# Patient Record
Sex: MALE | Race: WHITE | NOT HISPANIC OR LATINO | URBAN - NONMETROPOLITAN AREA
[De-identification: names, ages, dates, MRNs, and addresses within clinical notes are randomized per-mention and may not be internally consistent; named-entity substitution may affect disease eponyms.]

---

## 2021-01-15 ENCOUNTER — OFFICE VISIT (OUTPATIENT)
Dept: FAMILY MEDICINE CLINIC | Facility: CLINIC | Age: 51
End: 2021-01-15
Payer: COMMERCIAL

## 2021-01-15 VITALS
DIASTOLIC BLOOD PRESSURE: 78 MMHG | HEIGHT: 72 IN | RESPIRATION RATE: 18 BRPM | WEIGHT: 200 LBS | TEMPERATURE: 97.7 F | OXYGEN SATURATION: 98 % | SYSTOLIC BLOOD PRESSURE: 138 MMHG | HEART RATE: 84 BPM | BODY MASS INDEX: 27.09 KG/M2

## 2021-01-15 DIAGNOSIS — E11.9 TYPE 2 DIABETES MELLITUS WITHOUT COMPLICATION, WITH LONG-TERM CURRENT USE OF INSULIN (HCC): ICD-10-CM

## 2021-01-15 DIAGNOSIS — I10 HTN, GOAL BELOW 140/80: ICD-10-CM

## 2021-01-15 DIAGNOSIS — E11.9 TYPE 2 DIABETES MELLITUS WITH HEMOGLOBIN A1C GOAL OF LESS THAN 7.0% (HCC): ICD-10-CM

## 2021-01-15 DIAGNOSIS — Z79.4 TYPE 2 DIABETES MELLITUS WITHOUT COMPLICATION, WITH LONG-TERM CURRENT USE OF INSULIN (HCC): ICD-10-CM

## 2021-01-15 DIAGNOSIS — R31.9 URINARY TRACT INFECTION WITH HEMATURIA, SITE UNSPECIFIED: ICD-10-CM

## 2021-01-15 DIAGNOSIS — Z00.00 ENCOUNTER FOR MEDICAL EXAMINATION TO ESTABLISH CARE: Primary | ICD-10-CM

## 2021-01-15 DIAGNOSIS — Z12.11 SCREEN FOR COLON CANCER: ICD-10-CM

## 2021-01-15 DIAGNOSIS — N39.0 URINARY TRACT INFECTION WITH HEMATURIA, SITE UNSPECIFIED: ICD-10-CM

## 2021-01-15 LAB
SL AMB  POCT GLUCOSE, UA: ABNORMAL
SL AMB LEUKOCYTE ESTERASE,UA: 3
SL AMB POCT BILIRUBIN,UA: ABNORMAL
SL AMB POCT BLOOD,UA: ABNORMAL
SL AMB POCT CLARITY,UA: ABNORMAL
SL AMB POCT COLOR,UA: YELLOW
SL AMB POCT HEMOGLOBIN AIC: 7.9 (ref ?–6.5)
SL AMB POCT KETONES,UA: ABNORMAL
SL AMB POCT NITRITE,UA: ABNORMAL
SL AMB POCT PH,UA: 5
SL AMB POCT SPECIFIC GRAVITY,UA: 1.01
SL AMB POCT URINE PROTEIN: ABNORMAL
SL AMB POCT UROBILINOGEN: 0.2

## 2021-01-15 PROCEDURE — 81025 URINE PREGNANCY TEST: CPT | Performed by: FAMILY MEDICINE

## 2021-01-15 PROCEDURE — 83036 HEMOGLOBIN GLYCOSYLATED A1C: CPT | Performed by: FAMILY MEDICINE

## 2021-01-15 PROCEDURE — 99204 OFFICE O/P NEW MOD 45 MIN: CPT | Performed by: FAMILY MEDICINE

## 2021-01-15 RX ORDER — LISINOPRIL 10 MG/1
10 TABLET ORAL DAILY
Qty: 30 TABLET | Refills: 2 | Status: SHIPPED | OUTPATIENT
Start: 2021-01-15 | End: 2021-11-03 | Stop reason: SDUPTHER

## 2021-01-15 RX ORDER — LEVOFLOXACIN 500 MG/1
500 TABLET, FILM COATED ORAL EVERY 24 HOURS
Qty: 10 TABLET | Refills: 0 | Status: SHIPPED | OUTPATIENT
Start: 2021-01-15 | End: 2021-01-25

## 2021-01-15 RX ORDER — GABAPENTIN 100 MG/1
100 CAPSULE ORAL AS NEEDED
COMMUNITY

## 2021-01-15 RX ORDER — INSULIN GLARGINE 100 [IU]/ML
30 INJECTION, SOLUTION SUBCUTANEOUS DAILY
Qty: 900 UNITS | Refills: 2 | Status: SHIPPED | OUTPATIENT
Start: 2021-01-15 | End: 2021-03-05

## 2021-01-15 RX ORDER — INSULIN GLARGINE 100 [IU]/ML
INJECTION, SOLUTION SUBCUTANEOUS
COMMUNITY
End: 2021-01-15 | Stop reason: SDUPTHER

## 2021-01-15 NOTE — PATIENT INSTRUCTIONS
Take 3 units of novolog for blood sugar between 150-200  Take 5 units of novolog for blood sugar between 200-250  Take 7 units of novolog for blood sugar between 250-300  Take 9 units of novolog for blood sugar between 300-350

## 2021-01-15 NOTE — PROGRESS NOTES
Assessment/Plan:    No problem-specific Assessment & Plan notes found for this encounter  Diagnoses and all orders for this visit:    Encounter for medical examination to establish care    Type 2 diabetes mellitus without complication, with long-term current use of insulin ([facility])  -     POCT hemoglobin A1c  -     insulin aspart (NovoLOG) 100 units/mL injection; Take 3 units of novolog for blood sugar between 150-200  Take 5 units of novolog for blood sugar between 200-250  Take 7 units of novolog for blood sugar between 250-300  Take 9 units of novolog for blood sugar between 300-350  -     insulin glargine (LANTUS) 100 units/mL subcutaneous injection; Inject 30 Units under the skin daily 30-35 in AM  -     lisinopril (ZESTRIL) 10 mg tablet; Take 1 tablet (10 mg total) by mouth daily    Screen for colon cancer  -     Cologuard; Future    Urinary tract infection with hematuria, site unspecified  -     POCT urine dip  -     levofloxacin (LEVAQUIN) 500 mg tablet; Take 1 tablet (500 mg total) by mouth every 24 hours for 10 days    HTN, goal below 140/80    Type 2 diabetes mellitus with hemoglobin A1c goal of less than 7 0% ([facility])    Other orders  -     Discontinue: insulin glargine (LANTUS) 100 units/mL subcutaneous injection; Inject under the skin daily at bedtime 30-35 in AM  -     Discontinue: insulin aspart (NovoLOG) 100 units/mL injection; Inject under the skin as needed for high blood sugar  -     gabapentin (NEURONTIN) 100 mg capsule; Take 100 mg by mouth as needed          -records release for [facility] and Dr Monique signs  We will hold off on blood work until ER records are available   -reviewed new sliding scale with patient and provided handout  Discussed continue carb counting   -advised patient to complete abx for entire course  -RTC 6 weeks or sooner if needed    Subjective:      Patient ID: Truong Aguilar is a 45 y o  male who presents to establish care   He states he was evaluated and treated for DKA in [facility] last month  He has a history of type 2 DM for 15+ years  He notes due to insurance changes frequently goes without his insulin  He notes he is currently taking lantus 30 units every morning and sliding scale novolog, averaging between 5-8 units  He checks his blood sugar 2-3x daily and brought his glucometer today  Unfortunately his sugars are averaging ~230-250 before meals  He is actively carb counting and adjusting accordingly  His in office A1C today is 7 9%  he denies episodes of hypoglycemia  He follows with Dr Gato [place] podiatry  He is intolerant to [place] (URI like sypmtoms) and metformin (abdominal crampings)    He complains of burning with urination and urgency ongoing since Saturday but states it is getting worse  He has suprapubic pain but no flank pain  His urine is hazy but no visible blood  HPI    The following portions of the patient's history were reviewed and updated as appropriate: allergies, current medications, past family history, past medical history, past social history, past surgical history and problem list     Review of Systems   Constitutional: Negative  HENT: Negative  Eyes: Negative  Respiratory: Negative  Cardiovascular: Negative  Gastrointestinal: Negative  Endocrine: Negative  Genitourinary: Positive for dysuria, frequency and urgency  Negative for decreased urine volume, flank pain and hematuria  Suprapubic pain  cloudy   Musculoskeletal: Negative  Skin: Negative  Allergic/Immunologic: Negative  Neurological: Negative  Hematological: Negative  Psychiatric/Behavioral: Negative  Objective:      /78   Pulse 84   Temp 97 7 °F (36 5 °C)   Resp 18   Ht 6' (1 829 m)   Wt 90 7 kg (200 lb)   SpO2 98%   BMI 27 12 kg/m²     Urine dipstick shows positive for leukocytes, red blood cells  Physical Exam  Vitals signs reviewed  Constitutional:       General: He is not in acute distress  Appearance: Normal appearance  He is well-developed and normal weight  HENT:      Head: Normocephalic and atraumatic  Eyes:      Conjunctiva/sclera: Conjunctivae normal       Pupils: Pupils are equal, round, and reactive to light  Neck:      Musculoskeletal: Normal range of motion and neck supple  Thyroid: No thyromegaly  Cardiovascular:      Rate and Rhythm: Normal rate and regular rhythm  Heart sounds: Normal heart sounds  No murmur  Pulmonary:      Effort: Pulmonary effort is normal  No respiratory distress  Breath sounds: Normal breath sounds  No wheezing  Abdominal:      General: Bowel sounds are normal  There is no distension  Palpations: Abdomen is soft  Tenderness: There is no abdominal tenderness  Musculoskeletal: Normal range of motion  Skin:     General: Skin is warm and dry  Capillary Refill: Capillary refill takes less than 2 seconds  Neurological:      Mental Status: He is alert and oriented to person, place, and time  Psychiatric:         Mood and Affect: Mood normal          Behavior: Behavior normal          Thought Content:  Thought content normal          Judgment: Judgment normal

## 2021-01-28 ENCOUNTER — TELEPHONE (OUTPATIENT)
Dept: FAMILY MEDICINE CLINIC | Facility: HOME HEALTHCARE | Age: 51
End: 2021-01-28

## 2021-03-05 ENCOUNTER — OFFICE VISIT (OUTPATIENT)
Dept: FAMILY MEDICINE CLINIC | Facility: CLINIC | Age: 51
End: 2021-03-05
Payer: COMMERCIAL

## 2021-03-05 VITALS
BODY MASS INDEX: 26.68 KG/M2 | DIASTOLIC BLOOD PRESSURE: 70 MMHG | TEMPERATURE: 97.5 F | WEIGHT: 197 LBS | SYSTOLIC BLOOD PRESSURE: 124 MMHG | OXYGEN SATURATION: 98 % | HEIGHT: 72 IN | HEART RATE: 100 BPM | RESPIRATION RATE: 18 BRPM

## 2021-03-05 DIAGNOSIS — Z23 NEED FOR TDAP VACCINATION: ICD-10-CM

## 2021-03-05 DIAGNOSIS — Z79.4 TYPE 2 DIABETES MELLITUS WITHOUT COMPLICATION, WITH LONG-TERM CURRENT USE OF INSULIN (HCC): Primary | ICD-10-CM

## 2021-03-05 DIAGNOSIS — E11.9 TYPE 2 DIABETES MELLITUS WITHOUT COMPLICATION, WITH LONG-TERM CURRENT USE OF INSULIN (HCC): Primary | ICD-10-CM

## 2021-03-05 DIAGNOSIS — Z13.29 SCREENING FOR THYROID DISORDER: ICD-10-CM

## 2021-03-05 DIAGNOSIS — Z13.5 SCREENING FOR DIABETIC RETINOPATHY: ICD-10-CM

## 2021-03-05 DIAGNOSIS — Z11.4 SCREENING FOR HIV (HUMAN IMMUNODEFICIENCY VIRUS): ICD-10-CM

## 2021-03-05 PROCEDURE — 99213 OFFICE O/P EST LOW 20 MIN: CPT | Performed by: FAMILY MEDICINE

## 2021-03-05 RX ORDER — INSULIN GLARGINE 100 [IU]/ML
35 INJECTION, SOLUTION SUBCUTANEOUS DAILY
Qty: 1000 UNITS | Refills: 2
Start: 2021-03-05 | End: 2021-06-30

## 2021-03-05 RX ORDER — INSULIN LISPRO 100 [IU]/ML
INJECTION, SOLUTION SUBCUTANEOUS
Qty: 5 PEN | Refills: 1
Start: 2021-03-05 | End: 2021-11-03

## 2021-03-05 NOTE — PROGRESS NOTES
Assessment/Plan:    No problem-specific Assessment & Plan notes found for this encounter  Diagnoses and all orders for this visit:    Type 2 diabetes mellitus without complication, with long-term current use of insulin (HCC)  -     Microalbumin / creatinine urine ratio  -     CBC and differential; Future  -     Comprehensive metabolic panel; Future  -     HEMOGLOBIN A1C W/ EAG ESTIMATION; Future  -     Lipid Panel with Direct LDL reflex; Future  -     Ambulatory referral to Diabetic Education; Future  -     insulin glargine (LANTUS) 100 units/mL subcutaneous injection; Inject 35 Units under the skin daily 30-35 in AM  -     insulin lispro (HumaLOG Mauricio KwikPen) 100 units/mL injection pen; Take 3 units for blood sugar between 150-200  Take 5 units  for blood sugar between 200-250  Take 7 units for blood sugar between 250-300  Take 9 units for blood sugar between 300-350    Screening for diabetic retinopathy  -     Ambulatory referral to Ophthalmology; Future    Need for Tdap vaccination    Screening for HIV (human immunodeficiency virus)  -     HIV 1/2 Antigen/Antibody (4th Generation) w Reflex [facility]; Future    Screening for thyroid disorder  -     TSH, 3rd generation with Free T4 reflex; Future    Other orders  -     Cancel: Diabetic foot exam; Future  -     Cancel: Tdap vaccine greater than or equal to 6yo IM        -will increase lantus from 30 to 35 units  Discussed importance of frequent meals and monitoring BS  Will refer to DM educator for meal planning  Discussed risks/benefits of statin and patient declines  Advised him to let us know if his insulin in unaffordable    -labs as ordered to be completed prior to next visit  -RTC 6 weeks or sooner if concerns arise    Subjective:      Patient ID: Pepper Rahman is a 45 y o  male with a PMH Of type 2 DM, HTN presents for follow up  Unfortunately he has been having financial concerns obtaining his insulin which caused him to be out for several weeks  Additionally his formulary changed from novolog to humalog  Patient reports he now has his supplies and has been using them as directed for ~3 weeks  His fasting sugars remain elevated at 150-180  He is using his sliding scale as directed  He reports difficulty meal planning as he works second shift  He is interested in establishing with a dietician but reports financial concerns  He follows with podiatry Dr Gato [place]  His BP is at goal on his current regimen  No headaches, dizziness, vision changes  He is not on a statin and declines starting one today  Kamila Rivera reports his UTI symptoms from last visit have completely resolved  No acute complaints today  HPI    The following portions of the patient's history were reviewed and updated as appropriate: allergies, current medications, past family history, past medical history, past social history, past surgical history and problem list     Review of Systems   Constitutional: Negative  HENT: Negative  Eyes: Negative  Respiratory: Negative  Cardiovascular: Negative  Gastrointestinal: Negative  Endocrine: Negative  Genitourinary: Negative  Musculoskeletal: Negative  Skin: Negative  Allergic/Immunologic: Negative  Neurological: Negative  Hematological: Negative  Psychiatric/Behavioral: Negative  Objective:      /70   Pulse 100   Temp 97 5 °F (36 4 °C)   Resp 18   Ht 6' (1 829 m)   Wt 89 4 kg (197 lb)   SpO2 98%   BMI 26 72 kg/m²          Physical Exam  Vitals signs reviewed  Constitutional:       General: He is not in acute distress  Appearance: Normal appearance  He is well-developed and normal weight  HENT:      Head: Normocephalic and atraumatic  Eyes:      Conjunctiva/sclera: Conjunctivae normal       Pupils: Pupils are equal, round, and reactive to light  Neck:      Musculoskeletal: Normal range of motion and neck supple  Thyroid: No thyromegaly     Cardiovascular:      Rate and Rhythm: Normal rate and regular rhythm  Heart sounds: Normal heart sounds  No murmur  Pulmonary:      Effort: Pulmonary effort is normal  No respiratory distress  Breath sounds: Normal breath sounds  No wheezing  Abdominal:      General: Abdomen is flat  Bowel sounds are normal  There is no distension  Palpations: Abdomen is soft  Tenderness: There is no abdominal tenderness  Musculoskeletal: Normal range of motion  Right lower leg: No edema  Left lower leg: No edema  Skin:     General: Skin is warm and dry  Neurological:      Mental Status: He is alert and oriented to person, place, and time  Mental status is at baseline  Psychiatric:         Mood and Affect: Mood normal          Behavior: Behavior normal          Thought Content: Thought content normal          Judgment: Judgment normal          BMI Counseling: Body mass index is 26 72 kg/m²  The BMI is above normal  Nutrition recommendations include reducing portion sizes, decreasing overall calorie intake, 3-5 servings of fruits/vegetables daily, reducing fast food intake, consuming healthier snacks, decreasing soda and/or juice intake, moderation in carbohydrate intake, increasing intake of lean protein, reducing intake of saturated fat and trans fat and reducing intake of cholesterol  Exercise recommendations include moderate aerobic physical activity for 150 minutes/week, vigorous aerobic physical activity for 75 minutes/week, exercising 3-5 times per week, joining a gym and strength training exercises

## 2021-03-05 NOTE — PROGRESS NOTES
Patient's shoes and socks removed  Right Foot/Ankle   Right Foot Inspection  Skin Exam: dry skin                            Sensory       Monofilament testing: diminished  Vascular  Capillary refills: elevated      Left Foot/Ankle  Left Foot Inspection  Skin Exam: dry skin                                         Sensory       Monofilament: diminished  Vascular  Capillary refills: elevated    Assign Risk Category:  ; ;        Risk: 1

## 2021-04-08 ENCOUNTER — TELEPHONE (OUTPATIENT)
Dept: FAMILY MEDICINE CLINIC | Facility: CLINIC | Age: 51
End: 2021-04-08

## 2021-04-08 NOTE — TELEPHONE ENCOUNTER
johnny for pt to call office back to get him scheduled for his April appointment in [address] [place] [address]

## 2021-06-30 ENCOUNTER — OFFICE VISIT (OUTPATIENT)
Dept: FAMILY MEDICINE CLINIC | Facility: CLINIC | Age: 51
End: 2021-06-30
Payer: COMMERCIAL

## 2021-06-30 VITALS
HEART RATE: 118 BPM | OXYGEN SATURATION: 97 % | BODY MASS INDEX: 26.14 KG/M2 | TEMPERATURE: 97 F | RESPIRATION RATE: 18 BRPM | SYSTOLIC BLOOD PRESSURE: 120 MMHG | WEIGHT: 193 LBS | DIASTOLIC BLOOD PRESSURE: 78 MMHG | HEIGHT: 72 IN

## 2021-06-30 DIAGNOSIS — Z11.59 ENCOUNTER FOR HEPATITIS C SCREENING TEST FOR LOW RISK PATIENT: ICD-10-CM

## 2021-06-30 DIAGNOSIS — Z00.00 ANNUAL PHYSICAL EXAM: Primary | ICD-10-CM

## 2021-06-30 DIAGNOSIS — Z79.4 TYPE 2 DIABETES MELLITUS WITHOUT COMPLICATION, WITH LONG-TERM CURRENT USE OF INSULIN (HCC): ICD-10-CM

## 2021-06-30 DIAGNOSIS — N52.1 ERECTILE DYSFUNCTION DUE TO DISEASES CLASSIFIED ELSEWHERE: ICD-10-CM

## 2021-06-30 DIAGNOSIS — E11.9 TYPE 2 DIABETES MELLITUS WITHOUT COMPLICATION, WITH LONG-TERM CURRENT USE OF INSULIN (HCC): ICD-10-CM

## 2021-06-30 DIAGNOSIS — G47.9 RESTLESS SLEEPER: ICD-10-CM

## 2021-06-30 DIAGNOSIS — G25.81 RESTLESS LEGS: ICD-10-CM

## 2021-06-30 PROBLEM — I87.2 STASIS DERMATITIS OF BOTH LEGS: Status: ACTIVE | Noted: 2021-06-30

## 2021-06-30 LAB — SL AMB POCT HEMOGLOBIN AIC: 10.8 (ref ?–6.5)

## 2021-06-30 PROCEDURE — 99396 PREV VISIT EST AGE 40-64: CPT | Performed by: FAMILY MEDICINE

## 2021-06-30 PROCEDURE — 83036 HEMOGLOBIN GLYCOSYLATED A1C: CPT | Performed by: FAMILY MEDICINE

## 2021-06-30 RX ORDER — INSULIN GLARGINE 100 [IU]/ML
40 INJECTION, SOLUTION SUBCUTANEOUS DAILY
Qty: 10 ML | Refills: 2
Start: 2021-06-30 | End: 2021-09-28

## 2021-06-30 RX ORDER — ROPINIROLE 0.25 MG/1
TABLET, FILM COATED ORAL
Qty: 30 TABLET | Refills: 0 | Status: SHIPPED | OUTPATIENT
Start: 2021-06-30 | End: 2021-11-03

## 2021-06-30 NOTE — PROGRESS NOTES
[address]    NAME: Glenn Barriga  AGE: 45 y o  SEX: male  : 1970     DATE: 2021     Assessment and Plan:     Problem List Items Addressed This Visit     None      Visit Diagnoses     Annual physical exam    -  Primary    Encounter for hepatitis C screening test for low risk patient        Relevant Orders    Hepatitis C antibody    Type 2 diabetes mellitus without complication, with long-term current use of insulin (HCC)        Relevant Medications    insulin glargine (LANTUS) 100 units/mL subcutaneous injection    Other Relevant Orders    POCT hemoglobin A1c (Completed)    Restless sleeper        Relevant Orders    Home Study    Restless legs        Relevant Medications    rOPINIRole (REQUIP) 0 25 mg tablet    Other Relevant Orders    Home Study    Erectile dysfunction due to diseases classified elsewhere            -Increase lantus to 40 units daily  Discussed importance of frequent, consistent meals and adequate short term insulin dosing  He verbalized understanding  He is motivated to make the necessary dietary changes  -home sleep study as ordered  Will start requip 0 25 mg 2 hours prior to bedtime to control leg movements  He will increase to 0 5mg if needed  -RTC 3 months or sooner if concerns arise     Immunizations and preventive care screenings were discussed with patient today  Appropriate education was printed on patient's after visit summary  Counseling:  Alcohol/drug use: discussed moderation in alcohol intake, the recommendations for healthy alcohol use, and avoidance of illicit drug use  Dental Health: discussed importance of regular tooth brushing, flossing, and dental visits  Injury prevention: discussed safety/seat belts, safety helmets, smoke detectors, carbon dioxide detectors, and smoking near bedding or upholstery    Sexual health: discussed sexually transmitted diseases, partner selection, use of condoms, avoidance of unintended pregnancy, and contraceptive alternatives  · Exercise: the importance of regular exercise/physical activity was discussed  Recommend exercise 3-5 times per week for at least 30 minutes  Return in 3 months (on 9/30/2021)  Chief Complaint:     Chief Complaint   Patient presents with    Physical Exam     patient states he is scratching his legs open when he is sleeping    Diabetes     F/U- Patient states his sugars are running around the 200's      History of Present Illness:     Adult Annual Physical   Patient here for a comprehensive physical exam  The patient reports problems - his blood surgars have been elevated  He notes he is only checking his BS 2x daily, once fasting when he wakes and around 7PM (which is pt lunch 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 ) he notes he has not been compliant with his diet and insulin dosing  He takes his lantus as prescribed but only carb corrects when dosing humalog (1 unit per 10 carb)  His A1C is up to 10 8% today  He declines statin  His BP is well controlled on lisinopril  He has no headaches, changes in vision, CP  He states during sleep he has been scratching his legs with his toenails to the point where he bleeds  Initially he thought it was his dog, however, he then found a toenail at the site  He follows with Dr Tyrel Goddard podiatry for routine foot care  He does not experience RLS during the day  He does not inconsistent sleep due to his 2nd shift work schedule  He is unsure if he snores or not as he sleeps alone  No episodes or nocturnal choking  Maria Luisa Escalera is concerned with erectile dysfunction  This has been ongoing for years  He states he bought a medical grade pump that he is going to try before he considers medical therapy  He would be interested in having his testosterone level drawn  Diet and Physical Activity  · Diet/Nutrition: diabetic diet, limited junk food and low carb diet     · Exercise: no formal exercise  Depression Screening  PHQ-9 Depression Screening    PHQ-9:   Frequency of the following problems over the past two weeks:           General Health  · Sleep: sleeps poorly, gets 4-6 hours of sleep on average and unrefreshing sleep  · Hearing: normal - bilateral and 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  · Vision: goes for regular eye exams, most recent eye exam <1 year ago and wears glasses  · Dental: no dental visits for >1 year, brushes teeth twice daily and does not floss  Has dentist in [address]  · Symptoms include: erectile dysfunction     Review of Systems:     Review of Systems   Constitutional: Negative  HENT: Negative  Respiratory: Negative for cough, shortness of breath and wheezing  Cardiovascular: Negative for chest pain, palpitations and leg swelling  Gastrointestinal: Negative  Endocrine: Negative  Genitourinary: Negative  Musculoskeletal: Negative  Skin: Positive for color change and wound  Neurological: Negative for dizziness, weakness and light-headedness  Psychiatric/Behavioral: Positive for sleep disturbance  Past Medical History:     Past Medical History:   Diagnosis Date    Depression     [facility]       Past Surgical History:     History reviewed  No pertinent surgical history     Family History:     Family History   Problem Relation Age of Onset   Holli Weaver Colon cancer Mother     Heart disease Father       Social History:     Social History     Socioeconomic History    Marital status: Single     Spouse name: None    Number of children: None    Years of education: None    Highest education level: None   Occupational History    None   Tobacco Use    Smoking status: Former Smoker    Smokeless tobacco: Never Used    Tobacco comment: quite 8 years ago   Vaping Use    Vaping Use: Every day    Substances: Flavoring   Substance and Sexual Activity    Alcohol use: Yes     Comment: occasionally    Drug use: Never    Sexual activity: None   Other Topics Concern    None   Social History Narrative    None     Social Determinants of Health     Financial Resource Strain:     Difficulty of Paying Living Expenses:    Food Insecurity:     Worried About Running Out of Food in the Last Year:     [address] in the Last Year:    Transportation Needs:     Lack of Transportation (Medical):  Lack of Transportation (Non-Medical):    Physical Activity:     Days of Exercise per Week:     Minutes of Exercise per Session:    Stress:     Feeling of Stress :    Social Connections:     Frequency of Communication with Friends and Family:     Frequency of Social Gatherings with Friends and Family:     Attends Rastafarian Services:     Active Member of Clubs or Organizations:     Attends Club or Organization Meetings:     Marital Status:    Intimate Partner Violence:     Fear of Current or Ex-Partner:     Emotionally Abused:     Physically Abused:     Sexually Abused:       Current Medications:     Current Outpatient Medications   Medication Sig Dispense Refill    gabapentin (NEURONTIN) 100 mg capsule Take 100 mg by mouth as needed      insulin glargine (LANTUS) 100 units/mL subcutaneous injection Inject 40 Units under the skin daily 10 mL 2    insulin lispro (HumaLOG Mauricio KwikPen) 100 units/mL injection pen Take 3 units for blood sugar between 150-200  Take 5 units  for blood sugar between 200-250  Take 7 units for blood sugar between 250-300  Take 9 units for blood sugar between 300-350 5 pen 1    lisinopril (ZESTRIL) 10 mg tablet Take 1 tablet (10 mg total) by mouth daily 30 tablet 2    rOPINIRole (REQUIP) 0 25 mg tablet Take 1 pill 2 hours before bed  If no improvement, take 2 pills 2 hours before bed  30 tablet 0     No current facility-administered medications for this visit  Allergies:      Allergies   Allergen Reactions    Flu Virus Vaccine Nausea Only     Made sick in past      Physical Exam: /78   Pulse (!) 118   Temp (!) 97 °F (36 1 °C)   Resp 18   Ht 6' (1 829 m)   Wt 87 5 kg (193 lb)   SpO2 97%   BMI 26 18 kg/m²     Physical Exam  Vitals and nursing note reviewed  Constitutional:       Appearance: Normal appearance  He is well-developed and normal weight  HENT:      Head: Normocephalic and atraumatic  Right Ear: Tympanic membrane, ear canal and external ear normal       Left Ear: Tympanic membrane, ear canal and external ear normal       Mouth/Throat:      Mouth: Mucous membranes are dry  Eyes:      Conjunctiva/sclera: Conjunctivae normal    Cardiovascular:      Rate and Rhythm: Normal rate and regular rhythm  Pulses: Normal pulses  Heart sounds: No murmur heard  Pulmonary:      Effort: Pulmonary effort is normal  No respiratory distress  Breath sounds: Normal breath sounds  Abdominal:      General: Abdomen is flat  Palpations: Abdomen is soft  Tenderness: There is no abdominal tenderness  Musculoskeletal:      Cervical back: Normal range of motion and neck supple  Skin:     General: Skin is warm and dry  Capillary Refill: Capillary refill takes less than 2 seconds  Comments: Multiple, well healed excoriations to bilateral lower extremities consistent with pt history of scratching with toenails    bilateral stasis dermatitis to level of mid shin  No erythema or discharge    Neurological:      General: No focal deficit present  Mental Status: He is alert  Psychiatric:         Mood and Affect: Mood normal          Behavior: Behavior normal          Thought Content:  Thought content normal          Judgment: Judgment normal           ESAU Myrick

## 2021-06-30 NOTE — PATIENT INSTRUCTIONS

## 2021-11-03 ENCOUNTER — OFFICE VISIT (OUTPATIENT)
Dept: FAMILY MEDICINE CLINIC | Facility: CLINIC | Age: 51
End: 2021-11-03
Payer: COMMERCIAL

## 2021-11-03 VITALS
BODY MASS INDEX: 26.82 KG/M2 | HEIGHT: 72 IN | OXYGEN SATURATION: 98 % | DIASTOLIC BLOOD PRESSURE: 82 MMHG | RESPIRATION RATE: 18 BRPM | SYSTOLIC BLOOD PRESSURE: 130 MMHG | WEIGHT: 198 LBS | TEMPERATURE: 97.2 F | HEART RATE: 108 BPM

## 2021-11-03 DIAGNOSIS — I10 HTN, GOAL BELOW 140/80: ICD-10-CM

## 2021-11-03 DIAGNOSIS — Z11.4 SCREENING FOR HIV (HUMAN IMMUNODEFICIENCY VIRUS): ICD-10-CM

## 2021-11-03 DIAGNOSIS — Z79.4 TYPE 2 DIABETES MELLITUS WITHOUT COMPLICATION, WITH LONG-TERM CURRENT USE OF INSULIN (HCC): Primary | ICD-10-CM

## 2021-11-03 DIAGNOSIS — Z13.29 SCREENING FOR THYROID DISORDER: ICD-10-CM

## 2021-11-03 DIAGNOSIS — Z23 NEED FOR TDAP VACCINATION: ICD-10-CM

## 2021-11-03 DIAGNOSIS — E11.9 TYPE 2 DIABETES MELLITUS WITHOUT COMPLICATION, WITH LONG-TERM CURRENT USE OF INSULIN (HCC): Primary | ICD-10-CM

## 2021-11-03 DIAGNOSIS — Z11.59 ENCOUNTER FOR HEPATITIS C SCREENING TEST FOR LOW RISK PATIENT: ICD-10-CM

## 2021-11-03 DIAGNOSIS — Z23 NEED FOR INFLUENZA VACCINATION: ICD-10-CM

## 2021-11-03 LAB — SL AMB POCT HEMOGLOBIN AIC: 9.7 (ref ?–6.5)

## 2021-11-03 PROCEDURE — 99213 OFFICE O/P EST LOW 20 MIN: CPT | Performed by: FAMILY MEDICINE

## 2021-11-03 PROCEDURE — 83036 HEMOGLOBIN GLYCOSYLATED A1C: CPT | Performed by: FAMILY MEDICINE

## 2021-11-03 RX ORDER — LISINOPRIL 10 MG/1
10 TABLET ORAL DAILY
Qty: 90 TABLET | Refills: 1 | Status: SHIPPED | OUTPATIENT
Start: 2021-11-03 | End: 2022-02-01

## 2021-11-03 RX ORDER — INSULIN LISPRO 100 [IU]/ML
INJECTION, SOLUTION SUBCUTANEOUS
Start: 2021-11-03

## 2021-11-04 ENCOUNTER — TELEPHONE (OUTPATIENT)
Dept: ADMINISTRATIVE | Facility: OTHER | Age: 51
End: 2021-11-04

## 2022-10-17 ENCOUNTER — OFFICE VISIT (OUTPATIENT)
Dept: FAMILY MEDICINE CLINIC | Facility: CLINIC | Age: 52
End: 2022-10-17
Payer: COMMERCIAL

## 2022-10-17 VITALS
HEART RATE: 92 BPM | WEIGHT: 194 LBS | DIASTOLIC BLOOD PRESSURE: 82 MMHG | OXYGEN SATURATION: 97 % | TEMPERATURE: 97.2 F | HEIGHT: 72 IN | BODY MASS INDEX: 26.28 KG/M2 | RESPIRATION RATE: 18 BRPM | SYSTOLIC BLOOD PRESSURE: 124 MMHG

## 2022-10-17 DIAGNOSIS — Z79.4 TYPE 2 DIABETES MELLITUS WITHOUT COMPLICATION, WITH LONG-TERM CURRENT USE OF INSULIN (HCC): ICD-10-CM

## 2022-10-17 DIAGNOSIS — E11.9 TYPE 2 DIABETES MELLITUS WITHOUT COMPLICATION, WITH LONG-TERM CURRENT USE OF INSULIN (HCC): ICD-10-CM

## 2022-10-17 DIAGNOSIS — I10 HTN, GOAL BELOW 140/80: ICD-10-CM

## 2022-10-17 DIAGNOSIS — Z11.4 SCREENING FOR HIV (HUMAN IMMUNODEFICIENCY VIRUS): ICD-10-CM

## 2022-10-17 DIAGNOSIS — Z79.4 TYPE 2 DIABETES MELLITUS WITH HYPERGLYCEMIA, WITH LONG-TERM CURRENT USE OF INSULIN (HCC): Primary | ICD-10-CM

## 2022-10-17 DIAGNOSIS — Z11.59 NEED FOR HEPATITIS C SCREENING TEST: ICD-10-CM

## 2022-10-17 DIAGNOSIS — E11.65 TYPE 2 DIABETES MELLITUS WITH HYPERGLYCEMIA, WITH LONG-TERM CURRENT USE OF INSULIN (HCC): Primary | ICD-10-CM

## 2022-10-17 DIAGNOSIS — H61.92 SKIN LESION OF LEFT EAR: ICD-10-CM

## 2022-10-17 LAB — SL AMB POCT HEMOGLOBIN AIC: 8.5 (ref ?–6.5)

## 2022-10-17 PROCEDURE — 83036 HEMOGLOBIN GLYCOSYLATED A1C: CPT | Performed by: PHYSICIAN ASSISTANT

## 2022-10-17 PROCEDURE — 99213 OFFICE O/P EST LOW 20 MIN: CPT | Performed by: PHYSICIAN ASSISTANT

## 2022-10-17 RX ORDER — INSULIN LISPRO 100 [IU]/ML
INJECTION, SOLUTION SUBCUTANEOUS
Qty: 15 ML | Refills: 3 | Status: SHIPPED | OUTPATIENT
Start: 2022-10-17 | End: 2022-10-19

## 2022-10-17 RX ORDER — GABAPENTIN 100 MG/1
100 CAPSULE ORAL DAILY PRN
Qty: 90 CAPSULE | Refills: 2 | Status: SHIPPED | OUTPATIENT
Start: 2022-10-17

## 2022-10-17 RX ORDER — LISINOPRIL 10 MG/1
10 TABLET ORAL DAILY
Qty: 90 TABLET | Refills: 1 | Status: SHIPPED | OUTPATIENT
Start: 2022-10-17 | End: 2023-01-15

## 2022-10-17 NOTE — PROGRESS NOTES
Assessment/Plan:    No problem-specific Assessment & Plan notes found for this encounter  Diagnoses and all orders for this visit:    Type 2 diabetes mellitus with hyperglycemia, with long-term current use of insulin (HCC)  -     Microalbumin / creatinine urine ratio ([facility]); Future  -     POCT hemoglobin A1c  -     Ambulatory Referral to Ophthalmology; Future  -     CBC and differential; Future  -     Comprehensive metabolic panel; Future  -     Lipid panel; Future  -     TSH, 3rd generation with Free T4 reflex; Future  -     gabapentin (NEURONTIN) 100 mg capsule; Take 1 capsule (100 mg total) by mouth daily as needed (pain)    Need for hepatitis C screening test  -     Hepatitis C Antibody ([facility]); Future    Screening for HIV (human immunodeficiency virus)  -     HIV 1/2 Antigen/Antibody (4th Generation) w Reflex [facility]; Future    HTN, goal below 140/80  -     TSH, 3rd generation with Free T4 reflex; Future    Type 2 diabetes mellitus without complication, with long-term current use of insulin (HCC)  -     insulin lispro (HumaLOG Mauricio KwikPen) 100 units/mL injection pen; Take 1 unit insulin for every 10g carbohydrates in meals  -     lisinopril (ZESTRIL) 10 mg tablet; Take 1 tablet (10 mg total) by mouth daily    Skin lesion of left ear  Comments:  patient request removal/biopsy to r/o skin carcinoma         -lab work as ordered  -declines addition of sglt2 inhibitor  Would like to continue with diet and insulin for management of type 2 DM  -FUP with podiatry and opth as scheduled  -RTC ASAP for skin lesion removal, 3 months for DM follow up or sooner if concerns arise     Subjective:      Patient ID: Yazan Andrade is a 45 y o  male PMH Of type 2 DM, HTN presents for routine follow up  His A1C is 8 5%  he notes over the past few weeks making several dietary changes after going through a period of ~1 month of headaches, low energy, diarrhea   Since his changes he notes his blood sugar is much better controlled  He has been unable to start the jaurdiance secondary to financial strain  Follows with Dr Javon Marion podiatry - apt 11/2/22  BP at goal on lisinopril  Denies CP, dizziness, headaches  Notes a skin lesion on his left ear for about 1 month  It will bleed if he scratches it  It otherwise does not bother him  No injury to area  HPI    The following portions of the patient's history were reviewed and updated as appropriate: allergies, current medications, past family history, past medical history, past social history, past surgical history and problem list     Review of Systems   Constitutional: Negative  HENT: Negative  Respiratory: Negative  Cardiovascular: Negative  Gastrointestinal: Negative  Musculoskeletal: Negative  Skin: Positive for wound  Neurological: Negative  Psychiatric/Behavioral: Negative  Objective:      /82   Pulse 92   Temp (!) 97 2 °F (36 2 °C)   Resp 18   Ht 6' (1 829 m)   Wt 88 kg (194 lb)   SpO2 97%   BMI 26 31 kg/m²          Physical Exam  Vitals reviewed  Constitutional:       General: He is not in acute distress  Appearance: Normal appearance  He is well-developed and normal weight  HENT:      Head: Normocephalic and atraumatic  Right Ear: Tympanic membrane normal       Left Ear: Tympanic membrane normal       Ears:        Nose: Nose normal    Eyes:      Conjunctiva/sclera: Conjunctivae normal       Pupils: Pupils are equal, round, and reactive to light  Neck:      Thyroid: No thyromegaly  Cardiovascular:      Rate and Rhythm: Normal rate and regular rhythm  Pulses: Normal pulses  Heart sounds: Normal heart sounds  No murmur heard  Pulmonary:      Effort: Pulmonary effort is normal  No respiratory distress  Breath sounds: Normal breath sounds  No stridor  No wheezing, rhonchi or rales  Abdominal:      General: Bowel sounds are normal  There is no distension        Palpations: Abdomen is soft  Tenderness: There is no abdominal tenderness  Musculoskeletal:         General: Normal range of motion  Cervical back: Normal range of motion and neck supple  Right lower leg: No edema  Left lower leg: No edema  Skin:     General: Skin is warm and dry  Neurological:      Mental Status: He is alert and oriented to person, place, and time  Mental status is at baseline  Psychiatric:         Mood and Affect: Mood normal          Behavior: Behavior normal          Thought Content:  Thought content normal          Judgment: Judgment normal

## 2022-10-18 ENCOUNTER — TELEPHONE (OUTPATIENT)
Dept: FAMILY MEDICINE CLINIC | Facility: CLINIC | Age: 52
End: 2022-10-18

## 2022-10-18 DIAGNOSIS — E11.9 TYPE 2 DIABETES MELLITUS WITHOUT COMPLICATION, WITH LONG-TERM CURRENT USE OF INSULIN (HCC): ICD-10-CM

## 2022-10-18 DIAGNOSIS — Z79.4 TYPE 2 DIABETES MELLITUS WITHOUT COMPLICATION, WITH LONG-TERM CURRENT USE OF INSULIN (HCC): ICD-10-CM

## 2022-10-18 NOTE — TELEPHONE ENCOUNTER
pts pharmacy is requesting a new rx for [facility]  Requesting new rx be for a 90 day supply, original is for 30 days  States it will be cheaper for pt, [facility]    ty

## 2022-10-19 RX ORDER — INSULIN LISPRO 100 [IU]/ML
INJECTION, SOLUTION SUBCUTANEOUS
Qty: 45 ML | Refills: 3 | Status: SHIPPED | OUTPATIENT
Start: 2022-10-19

## 2022-10-21 ENCOUNTER — TELEPHONE (OUTPATIENT)
Dept: ADMINISTRATIVE | Facility: OTHER | Age: 52
End: 2022-10-21

## 2022-10-21 NOTE — TELEPHONE ENCOUNTER
----- Message from Tommy Ferreira sent at 10/21/2022  9:01 AM EDT -----  Regarding: Care Gap Request  10/21/22 9:01 AM    Hello, our patient attached above has had diabetic foot exam completed/performed  Please assist in updating the patient chart  The date of service is 2022  Dr Claudio Chisholm, [facility] [place]      Thank you,  Tommy Ferreira  [id number]

## 2022-10-21 NOTE — TELEPHONE ENCOUNTER
Upon review of the In Basket request and the patient's chart, initial outreach has been made via fax to facility  , please see Contacts section for details       Thank you  Nichelle Mckinley

## 2022-10-21 NOTE — LETTER
Diabetic Foot Exam Form    Date Requested: 10/21/22  Patient: Magdaleno Giron  Patient : 1970   Referring Provider: Jahaira White PA-C    Diabetic Foot Exam Performed with shoes and socks removed        Yes         No     Date of Diabetic Foot Exam ______________________________  Risk Score ____________________________________________    Left Foot       Visual Inspection         Monofilament Testing Sensory Exam        Pedal Pulses         Additional Comments         Right Foot      Visual Inspection         Monofilament Testing Sensory Exam       Pedal Pulses         Additional Comments         Comments __________________________________________________________    Practice Providing Exam ______________________________________________    Exam Performed By (print name) _______________________________________      Provider Signature ___________________________________________________      These reports are needed for  compliance  Please fax this completed form and a copy of the Diabetic Foot Exam report to our office located at [place] [address] as soon as possible via [phone] attention [place]: Phone [phone]    We thank you for your assistance in treating our mutual patient

## 2022-10-21 NOTE — LETTER
Diabetic Foot Exam Form    Date Requested: 10/31/22  Patient: Jennifer Sen  Patient : 1970   Referring Provider: Alberto Pineda PA-C    Diabetic Foot Exam Performed with shoes and socks removed        Yes         No     Date of Diabetic Foot Exam ______________________________  Risk Score ____________________________________________    Left Foot       Visual Inspection         Monofilament Testing Sensory Exam        Pedal Pulses         Additional Comments         Right Foot      Visual Inspection         Monofilament Testing Sensory Exam       Pedal Pulses         Additional Comments         Comments __________________________________________________________    Practice Providing Exam ______________________________________________    Exam Performed By (print name) _______________________________________      Provider Signature ___________________________________________________      These reports are needed for  compliance  Please fax this completed form and a copy of the Diabetic Foot Exam report to our office located at [place] [address] as soon as possible via [phone] attention [place]: Phone [phone]    We thank you for your assistance in treating our mutual patient

## 2022-10-28 NOTE — TELEPHONE ENCOUNTER
As a follow-up, a second attempt has been made for outreach via telephone call to facility  , please see Contacts section for details      Thank you  Jemmia Middleton

## 2022-10-31 NOTE — TELEPHONE ENCOUNTER
As a final attempt, a third outreach has been made via fax to facility  Please see Contacts section for details  This encounter will be closed and completed by end of day  Should we receive the requested information because of previous outreach attempts, the requested patient's chart will be updated appropriately       Thank you  Zac Morris

## 2022-11-17 ENCOUNTER — OFFICE VISIT (OUTPATIENT)
Dept: FAMILY MEDICINE CLINIC | Facility: CLINIC | Age: 52
End: 2022-11-17

## 2022-11-17 VITALS
DIASTOLIC BLOOD PRESSURE: 80 MMHG | HEIGHT: 72 IN | WEIGHT: 194 LBS | OXYGEN SATURATION: 97 % | HEART RATE: 89 BPM | SYSTOLIC BLOOD PRESSURE: 124 MMHG | BODY MASS INDEX: 26.28 KG/M2

## 2022-11-17 DIAGNOSIS — E11.9 TYPE 2 DIABETES MELLITUS WITH HEMOGLOBIN A1C GOAL OF LESS THAN 7.0% (HCC): ICD-10-CM

## 2022-11-17 DIAGNOSIS — H93.90 EAR LESION: Primary | ICD-10-CM

## 2022-11-17 DIAGNOSIS — F32.5 MAJOR DEPRESSIVE DISORDER WITH SINGLE EPISODE, IN FULL REMISSION (HCC): ICD-10-CM

## 2022-11-17 DIAGNOSIS — I10 HTN, GOAL BELOW 140/80: ICD-10-CM

## 2022-11-17 NOTE — PROGRESS NOTES
Assessment/Plan:  Referral to dermatology as the telangiectasias on the lesion are concerning for carcinoma but the area of the ear is difficult to anesthetize and do a biopsy  Diabetes is not very well controlled at this time, but the patient is making multiple dietary changes to control blood sugar and monitors his glucose regularly, so no adjustments at this time and will see how dietary changes manifest   HTN controlled, MDD in remission  Flu vaccine offered, declined  Diagnoses and all orders for this visit:    Ear lesion  -     Ambulatory Referral to Dermatology; Future    Type 2 diabetes mellitus with hemoglobin A1c goal of less than 7 0% ([facility])    HTN, goal below 140/80    Major depressive disorder with single episode, in full remission ([facility] [address] )           Subjective:      Patient ID: Aramis Mcneil is a 45 y o  male  CC: left outer ear lesion  Noticed 3-4 months ago an itch on his left inner ear, thought it was a pimple, scratched it and it bled and eventually healed, but the lesion persisted  Has not changed in size, unknown if color change  Not currently itching, no pain in the ear, hearing changes  ROS negative except for peripheral neuropathy from diabetes  No trauma to the ear, no family or personal Hx skin cancer, as a kid was big into the outdoors, now only rarely  No tanning booths  Diabetes 15+ years: last A1c 8 5 in October, recently started changing diet to reduce high-carb meals with vegetables  Has been feeling headaches when waking up, fatigue, anhedonia which has been improving with the diet  On insulin, taking it as prescribed  Occasionally has hypoglycemia to the 70's with mild weakness, for the most part feels well taking it  Blood sugars normally 120-200, still trying to cut out TV dinners and other less nutritious food  Has Hx depression, was on wellbutrin + prozac which helped  No longer on them since he felt better and stopped taking them   Has days where he feels down and possible lack of interest in going out nowadays, but other SIGECAPS depression screening categories are negative  BP well controlled on current meds    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  The following portions of the patient's history were reviewed and updated as appropriate: allergies, current medications, past family history, past medical history, past social history, past surgical history and problem list     Review of Systems   Constitutional: Negative for chills and fever  HENT: Negative for ear discharge, ear pain, facial swelling, hearing loss, sinus pressure, sinus pain and sore throat  Eyes: Negative for pain and visual disturbance  Respiratory: Negative for cough and shortness of breath  Cardiovascular: Negative for chest pain and palpitations  Gastrointestinal: Negative for abdominal pain and vomiting  Genitourinary: Negative for dysuria and hematuria  Musculoskeletal: Negative for arthralgias and back pain  Skin: Negative for color change, pallor, rash and wound  Left ear bump   Neurological: Negative for seizures and syncope  Psychiatric/Behavioral: Negative for decreased concentration and dysphoric mood  All other systems reviewed and are negative  Objective:      /80   Pulse 89   Ht 6' (1 829 m)   Wt 88 kg (194 lb)   SpO2 97%   BMI 26 31 kg/m²          Physical Exam  Constitutional:       General: He is not in acute distress  Appearance: Normal appearance  He is not ill-appearing  HENT:      Head: Normocephalic  Right Ear: External ear normal       Left Ear: External ear normal       Ears:      Comments: Left outer ear mildly superior to the canal with roughly 1cm oval lesion, hard, immobile, on magnification telangiectasias are present  nontender     Nose: No rhinorrhea  Eyes:      General:         Right eye: No discharge  Left eye: No discharge        Conjunctiva/sclera: Conjunctivae normal  Cardiovascular:      Rate and Rhythm: Normal rate and regular rhythm  Heart sounds: Normal heart sounds  No murmur heard  No friction rub  No gallop  Pulmonary:      Effort: Pulmonary effort is normal  No respiratory distress  Breath sounds: Normal breath sounds  No wheezing, rhonchi or rales  Abdominal:      General: Bowel sounds are normal  There is no distension  Palpations: Abdomen is soft  There is no mass  Tenderness: There is no abdominal tenderness  There is no guarding or rebound  Musculoskeletal:      Cervical back: Neck supple  Lymphadenopathy:      Cervical: No cervical adenopathy  Skin:     General: Skin is warm and dry  Neurological:      Mental Status: He is alert

## 2023-06-01 DIAGNOSIS — E11.9 TYPE 2 DIABETES MELLITUS WITH HEMOGLOBIN A1C GOAL OF LESS THAN 7.0% (HCC): Primary | ICD-10-CM

## 2023-06-29 ENCOUNTER — TELEPHONE (OUTPATIENT)
Dept: DIABETES SERVICES | Facility: OTHER | Age: 53
End: 2023-06-29

## 2023-07-06 ENCOUNTER — TELEPHONE (OUTPATIENT)
Dept: DIABETES SERVICES | Facility: OTHER | Age: 53
End: 2023-07-06

## 2023-07-14 ENCOUNTER — OFFICE VISIT (OUTPATIENT)
Dept: DIABETES SERVICES | Facility: OTHER | Age: 53
End: 2023-07-14
Payer: COMMERCIAL

## 2023-07-14 DIAGNOSIS — E11.9 TYPE 2 DIABETES, HBA1C GOAL < 7% (HCC): Primary | ICD-10-CM

## 2023-07-14 PROCEDURE — G0108 DIAB MANAGE TRN  PER INDIV: HCPCS | Performed by: DIETITIAN, REGISTERED

## 2023-07-20 NOTE — PROGRESS NOTES
Virtual Brief Visit    This Visit is being completed by telephone. The Patient is located at Home and in the following state in which I hold an active license [place]    The patient was identified by name and date of birth. Isbaelle Angulo was informed that this is a telemedicine visit and that the visit is being conducted through the [employer]. He agrees to proceed. .  My office door was closed. No one else was in the room. He acknowledged consent and understanding of privacy and security of the video platform. The patient has agreed to participate and understands they can discontinue the visit at any time. Patient is aware this is a billable service. Assessment/Plan:    Problem List Items Addressed This Visit    None      Recent Visits  No visits were found meeting these conditions. Showing recent visits within past 7 days and meeting all other requirements  Future Appointments  No visits were found meeting these conditions.   Showing future appointments within next 150 days and meeting all other requirements         Visit Time  Total Visit Duration: 60 minutes

## 2023-07-20 NOTE — PROGRESS NOTES
Type 2 Diabetes Class Assessment    HPI: Met with Burt Egan for DSME Initial visit. Paolo Medina has Type 2 Diabetes. Liz Mohr has diabetes since he was 38years old. He is adjusting his insulin based off his blood sugars. He is trying to eat healthier. He consumes 3 meals daily. For breakfast, he will have yogurt cup with granola and hard boiled egg, coffee with half-half; lunch may be TV dinner, salad or sandwich and dinner is protein, sweet potato and vegetables. He snacks on salt and vinegar pork rinds. Mostly consumes water. Diabetes Assessment  Visit Type: Initial visit  Present at Session: patient   Medical Diagnosis/ICD 10: E11.9 (ICD-10-CM) - Type 2 diabetes mellitus with hemoglobin A1c goal of less than 7.0% ([facility])  Special Learning Needs: No  Barriers to Learning: no barriers    How do you feel about making lifestyle changes at this time? "I will do it, absolutely"  How would you rate your current knowledge of diabetes? excellent  How confident are you that will be able to take better control of your diabetes?: very good    How long have you had diabetes? ~17 years  Have you had diabetes education in the past?: No  Do you have any family members with diabetes?: Yes  Do you monitor your blood sugar? yes  Type of blood sugar monitor: metene glucometer  How old is your meter?: 2 years   How often do you test your blood sugars? 3-4 x per day  Do you keep a written record of your blood sugars? No; keeps them in his glucometer memory  Blood sugar log with patient today and reviewed by educator?: Yes; verbally reviewed his blood sugar range;   Blood Sugar ranges:    Fasting:    Before meals:    2 hours after meals:   Any financial concerns pertaining to your diabetes supplies, medication or care?: Yes, cost of insulin plus the increasing cost of groceries and house bills is stretching my funds.   Have you ever experienced hypoglycemia?:  No  Have you ever been hospitalized or gone to the ER due to your blood sugars?: No; only been hospitalized once for DKA  How do you treat low blood sugars?: reviewed 15-15 rule  How do you treat high blood sugars?  educated  Do you wear a Diabetes I.D.?: no; made patient aware of the benefits of having ID on in case he would experience hypo/hyperglycemia and became confused/unresponsive  Where do you dispose of your sharps (needles,lancetes)?: garbage; reviewed proper disposal of sharps    Ht Readings from Last 1 Encounters:   11/17/22 6' (1.829 m)     Wt Readings from Last 1 Encounters:   11/17/22 88 kg (194 lb)     BMI 27.8, overweight  Weight Change: no significant change    Diet Assessment    Do you follow any special diet presently?: Yes, decrease carbs  Who cooks at home?:  patient  Who does the grocery shopping?: patient   How frequently do you eat out?: less than 1-2 x monthly    Activity Assessment    Exercise: patient reports he walks 5-6 miles 1-2 x per week; more difficult to exercise in winter months    Lifestyle/Social Assessment    Racial/ethnic group:                                       Primary Language: English  Marital Status: Single  Education Level: High School Graduate  and tech school  Work status: Full Time  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  Who lives in your household?: Patient (and puppy)  Who is you primary support person(s): sibling(s)   Describe your quality of life currently?: good  Any concerns for your safety?: No  Any Alevism or cultural practices that may affect your diabetes care: No  Do you have a decrease or loss of hearing?: Yes, has tinnitus  Do you have a decrease or loss of vision?: Yes  When was the last time you had an ophthalmology exam?: couple of years ago  When was the last time you had dental exam?: before COVID  Do you check your feet for cracks, sores, debris?: Yes  When was the last time you had podiatry or foot exam?: follows with a podiatrist every 3 months  Last flu shot?: reports he got sick after taking flu shot in past, now declines  Pneumonia shot?: Yes  COVID vaccine:  yes    Lab Results   Component Value Date    HGBA1C 8.5 (A) 10/17/2022     No results found for: "CHOL", "HDL", "LDLCALC", "TRIG"  No results found for: "Alvin Monon", "Raquel Harris"      The patient's history was reviewed and updated as appropriate: allergies, current medications. Intervention    Diabetes Overview :   Paolo Medina was instructed on basic concepts of diabetes, including identifying role of diabetes self management, basic pathophysiology and types of diabetes, A1c and blood sugar targets. Paolo Medina has good understanding of material covered. Taking Medications: Instructed patient on action, side effects, efficacy, prescribed dosage and appropriate timing and frequency of administration of his diabetes medication. Paolo Medina has good understanding of material covered. Monitoring Blood Sugars  Instructions for Meter Teaching- Patient successfully testing blood sugars and offers no concerns. Testing frequency: Encouraged pair testing. Test sugars before a meal and 2hr after the same meal, rotating between breakfast, lunch, and dinner. Test sugars twice a day (3 days a week, 7 days a week). Goal Blood Sugars:   Premeal , even better <110  2hr after a meal <180, even better <140  A1C <7%, even better <6.5%. Hypoglycemia: Instructed patient on definition/risk of hypoglycemia, treatment, causes/symptoms, when to notify provider of lows, prevention of hypoglycemia and exercise precautions. Comments: Jazzy Ziegler understanding of hypoglycemia concepts      Physical Activity: Discussed benefits of physical activity to optimize blood glucose control, encouraged activity at patient is physically able.  Always consult a physician prior to starting an exercise program.  Comments: Jazzy Zigeler understanding of hypoglycemia concepts        Diabetes Education Record  Paolo Medina received the following handouts: basic diabetes guidelines, carb counting, food label, A1c chart and goal      Patient response to instruction    Comprehensiongood  Motivationgood  Expected Compliancegood  Response to Teachback: 100%, demonstrated understanding    Begin Time: 1:05 pm  End Time: 2:05 pm  Referring Provider: Dr. Rosalinda Echeverria    Thank you for referring your patient to [address] [place] [address] Jonel Fishman, it was a pleasure working with them today. Please feel free to call with any questions or concerns.     Pedro Mcburney, RD  [address]  [phone]

## 2023-08-11 ENCOUNTER — OFFICE VISIT (OUTPATIENT)
Dept: FAMILY MEDICINE CLINIC | Facility: CLINIC | Age: 53
End: 2023-08-11
Payer: COMMERCIAL

## 2023-08-11 VITALS
OXYGEN SATURATION: 97 % | BODY MASS INDEX: 28.44 KG/M2 | HEART RATE: 91 BPM | HEIGHT: 72 IN | SYSTOLIC BLOOD PRESSURE: 124 MMHG | DIASTOLIC BLOOD PRESSURE: 80 MMHG | WEIGHT: 210 LBS

## 2023-08-11 DIAGNOSIS — E11.9 TYPE 2 DIABETES MELLITUS WITHOUT COMPLICATION, WITH LONG-TERM CURRENT USE OF INSULIN (HCC): ICD-10-CM

## 2023-08-11 DIAGNOSIS — R19.7 DIARRHEA, UNSPECIFIED TYPE: Primary | ICD-10-CM

## 2023-08-11 DIAGNOSIS — E11.9 TYPE 2 DIABETES, HBA1C GOAL < 7% (HCC): ICD-10-CM

## 2023-08-11 DIAGNOSIS — Z79.4 TYPE 2 DIABETES MELLITUS WITHOUT COMPLICATION, WITH LONG-TERM CURRENT USE OF INSULIN (HCC): ICD-10-CM

## 2023-08-11 DIAGNOSIS — Z11.4 SCREENING FOR HIV (HUMAN IMMUNODEFICIENCY VIRUS): ICD-10-CM

## 2023-08-11 DIAGNOSIS — G47.33 OSA (OBSTRUCTIVE SLEEP APNEA): ICD-10-CM

## 2023-08-11 DIAGNOSIS — Z11.59 NEED FOR HEPATITIS C SCREENING TEST: ICD-10-CM

## 2023-08-11 LAB — SL AMB POCT HEMOGLOBIN AIC: 8.8 (ref ?–6.5)

## 2023-08-11 PROCEDURE — 99213 OFFICE O/P EST LOW 20 MIN: CPT | Performed by: PHYSICIAN ASSISTANT

## 2023-08-11 PROCEDURE — 83036 HEMOGLOBIN GLYCOSYLATED A1C: CPT | Performed by: PHYSICIAN ASSISTANT

## 2023-08-11 RX ORDER — INSULIN GLARGINE 100 [IU]/ML
45 INJECTION, SOLUTION SUBCUTANEOUS DAILY
Qty: 40.5 ML | Refills: 2 | Status: SHIPPED | OUTPATIENT
Start: 2023-08-11 | End: 2023-11-09

## 2023-08-11 RX ORDER — INSULIN LISPRO 100 [IU]/ML
INJECTION, SOLUTION SUBCUTANEOUS
Qty: 45 ML | Refills: 3 | Status: SHIPPED | OUTPATIENT
Start: 2023-08-11

## 2023-08-11 NOTE — PROGRESS NOTES
Assessment/Plan:    No problem-specific Assessment & Plan notes found for this encounter. Diagnoses and all orders for this visit:    Diarrhea, unspecified type  -     Basic metabolic panel; Future  -     CBC and differential; Future  -     TSH, 3rd generation with Free T4 reflex; Future  -     Stool culture; Future  -     US abdomen complete; Future    Need for hepatitis C screening test  -     Hepatitis C Antibody; Future    Type 2 diabetes, HbA1c goal < 7% (HCC)  -     Hepatitis C Antibody; Future  -     Basic metabolic panel; Future  -     Albumin / creatinine urine ratio; Future  -     POCT hemoglobin A1c  -     Ambulatory Referral to Ophthalmology; Future  -     CBC and differential; Future  -     Comprehensive metabolic panel; Future  -     TSH, 3rd generation with Free T4 reflex; Future  -     Lipid panel; Future    Screening for HIV (human immunodeficiency virus)    PONCHO (obstructive sleep apnea)  Comments:  on CPAP    Type 2 diabetes mellitus without complication, with long-term current use of insulin ([facility])  -     insulin glargine (LANTUS) 100 units/mL subcutaneous injection; Inject 45 Units under the skin daily          Will check labs / stool / US  Advised eliminating dairy   Increase lantus to 45mg qhs  FUP with DM educator  RTC 2 weeks or sooner if concerns arise    Subjective:      Patient ID: Emi Snider is a 47 y.o. male type 2 DM, tobacco use, HTN, PONCHO presents for evaluation of diarrhea and flatuence. Notes has been ongoing for 1 month where he will have frequent episodes of watery diarrhea, 5-6x per day. There is some mucus but no blood. He has abdominal rumbling and gas but no pain. No history of abdominal surgery. He has been using kaopectate without relief. Recently met with DM educator and has mad some dietary changes in regards to management of his DM. He states his diarrhea seems to be worse with dairy products.  His A1C today is 8.8%    HPI    The following portions of the patient's history were reviewed and updated as appropriate: allergies, current medications, past family history, past medical history, past social history, past surgical history and problem list.    Review of Systems   Constitutional: Negative. HENT: Negative. Respiratory: Negative. Cardiovascular: Negative. Gastrointestinal: Positive for diarrhea. Negative for abdominal pain, blood in stool, constipation, nausea, rectal pain and vomiting. Objective:      /80   Pulse 91   Ht 6' (1.829 m)   Wt 95.3 kg (210 lb)   SpO2 97%   BMI 28.48 kg/m²          Physical Exam  Vitals reviewed. Constitutional:       General: He is not in acute distress. Appearance: He is well-developed and overweight. HENT:      Head: Normocephalic and atraumatic. Eyes:      Conjunctiva/sclera: Conjunctivae normal.      Pupils: Pupils are equal, round, and reactive to light. Neck:      Thyroid: No thyromegaly. Cardiovascular:      Rate and Rhythm: Normal rate and regular rhythm. Heart sounds: Normal heart sounds. No murmur heard. Pulmonary:      Effort: Pulmonary effort is normal. No respiratory distress. Breath sounds: Normal breath sounds. No wheezing. Abdominal:      General: Abdomen is protuberant. Bowel sounds are normal. There is no distension. Palpations: Abdomen is soft. There is no mass. Tenderness: There is no abdominal tenderness. There is no guarding or rebound. Hernia: No hernia is present. Musculoskeletal:         General: Normal range of motion. Cervical back: Normal range of motion and neck supple. Skin:     General: Skin is warm and dry. Neurological:      Mental Status: He is alert and oriented to person, place, and time. Mental status is at baseline. Psychiatric:         Mood and Affect: Mood normal.         Behavior: Behavior normal.         Thought Content:  Thought content normal.         Judgment: Judgment normal.

## 2023-08-11 NOTE — LETTER
August 11, 2023     Patient: Alessia Byrd  YOB: 1970  Date of Visit: 8/11/2023      To Whom it May Concern:    Honorio Ramses is under my professional care. Ulices Medrano was seen in my office on 8/11/2023. Please make reasonable accommodations for Ulices Medrano as he has been undergoing various medical diagnosis requiring additional treatment and testing. If you have any questions or concerns, please don't hesitate to call.          Sincerely,          Kamila Salazar PA-C        CC: No Recipients

## 2023-08-19 ENCOUNTER — APPOINTMENT (OUTPATIENT)
Dept: LAB | Facility: HOSPITAL | Age: 53
End: 2023-08-19
Payer: COMMERCIAL

## 2023-08-19 DIAGNOSIS — R19.7 DIARRHEA, UNSPECIFIED TYPE: ICD-10-CM

## 2023-08-19 DIAGNOSIS — Z11.59 NEED FOR HEPATITIS C SCREENING TEST: ICD-10-CM

## 2023-08-19 DIAGNOSIS — E11.9 TYPE 2 DIABETES, HBA1C GOAL < 7% (HCC): ICD-10-CM

## 2023-08-19 DIAGNOSIS — Z11.4 SCREENING FOR HIV (HUMAN IMMUNODEFICIENCY VIRUS): ICD-10-CM

## 2023-08-19 LAB
ALBUMIN SERPL BCP-MCNC: 4.3 G/DL (ref 3.5–5)
ALP SERPL-CCNC: 48 U/L (ref 34–104)
ALT SERPL W P-5'-P-CCNC: 28 U/L (ref 7–52)
ANION GAP SERPL CALCULATED.3IONS-SCNC: 6 MMOL/L
AST SERPL W P-5'-P-CCNC: 17 U/L (ref 13–39)
BASOPHILS # BLD AUTO: 0.05 THOUSANDS/ÂΜL (ref 0–0.1)
BASOPHILS NFR BLD AUTO: 1 % (ref 0–1)
BILIRUB SERPL-MCNC: 0.36 MG/DL (ref 0.2–1)
BUN SERPL-MCNC: 19 MG/DL (ref 5–25)
CALCIUM SERPL-MCNC: 9.8 MG/DL (ref 8.4–10.2)
CHLORIDE SERPL-SCNC: 103 MMOL/L (ref 96–108)
CHOLEST SERPL-MCNC: 128 MG/DL
CO2 SERPL-SCNC: 29 MMOL/L (ref 21–32)
CREAT SERPL-MCNC: 0.88 MG/DL (ref 0.6–1.3)
CREAT UR-MCNC: 44.1 MG/DL
EOSINOPHIL # BLD AUTO: 0.07 THOUSAND/ÂΜL (ref 0–0.61)
EOSINOPHIL NFR BLD AUTO: 1 % (ref 0–6)
ERYTHROCYTE [DISTWIDTH] IN BLOOD BY AUTOMATED COUNT: 13.4 % (ref 11.6–15.1)
GFR SERPL CREATININE-BSD FRML MDRD: 98 ML/MIN/1.73SQ M
GLUCOSE SERPL-MCNC: 148 MG/DL (ref 65–140)
HCT VFR BLD AUTO: 46 % (ref 36.5–49.3)
HDLC SERPL-MCNC: 43 MG/DL
HGB BLD-MCNC: 15 G/DL (ref 12–17)
IMM GRANULOCYTES # BLD AUTO: 0.03 THOUSAND/UL (ref 0–0.2)
IMM GRANULOCYTES NFR BLD AUTO: 0 % (ref 0–2)
LDLC SERPL CALC-MCNC: 75 MG/DL (ref 0–100)
LYMPHOCYTES # BLD AUTO: 1.36 THOUSANDS/ÂΜL (ref 0.6–4.47)
LYMPHOCYTES NFR BLD AUTO: 15 % (ref 14–44)
MCH RBC QN AUTO: 27.5 PG (ref 26.8–34.3)
MCHC RBC AUTO-ENTMCNC: 32.6 G/DL (ref 31.4–37.4)
MCV RBC AUTO: 84 FL (ref 82–98)
MICROALBUMIN UR-MCNC: 110 MG/L (ref 0–20)
MICROALBUMIN/CREAT 24H UR: 249 MG/G CREATININE (ref 0–30)
MONOCYTES # BLD AUTO: 0.6 THOUSAND/ÂΜL (ref 0.17–1.22)
MONOCYTES NFR BLD AUTO: 7 % (ref 4–12)
NEUTROPHILS # BLD AUTO: 6.99 THOUSANDS/ÂΜL (ref 1.85–7.62)
NEUTS SEG NFR BLD AUTO: 76 % (ref 43–75)
NONHDLC SERPL-MCNC: 85 MG/DL
NRBC BLD AUTO-RTO: 0 /100 WBCS
PLATELET # BLD AUTO: 262 THOUSANDS/UL (ref 149–390)
PMV BLD AUTO: 8.7 FL (ref 8.9–12.7)
POTASSIUM SERPL-SCNC: 4 MMOL/L (ref 3.5–5.3)
PROT SERPL-MCNC: 7.5 G/DL (ref 6.4–8.4)
RBC # BLD AUTO: 5.45 MILLION/UL (ref 3.88–5.62)
SODIUM SERPL-SCNC: 138 MMOL/L (ref 135–147)
TRIGL SERPL-MCNC: 48 MG/DL
TSH SERPL DL<=0.05 MIU/L-ACNC: 0.97 UIU/ML (ref 0.45–4.5)
WBC # BLD AUTO: 9.1 THOUSAND/UL (ref 4.31–10.16)

## 2023-08-19 PROCEDURE — 85025 COMPLETE CBC W/AUTO DIFF WBC: CPT

## 2023-08-19 PROCEDURE — 84443 ASSAY THYROID STIM HORMONE: CPT

## 2023-08-19 PROCEDURE — 80053 COMPREHEN METABOLIC PANEL: CPT

## 2023-08-19 PROCEDURE — 82043 UR ALBUMIN QUANTITATIVE: CPT

## 2023-08-19 PROCEDURE — 80061 LIPID PANEL: CPT

## 2023-08-19 PROCEDURE — 87389 HIV-1 AG W/HIV-1&-2 AB AG IA: CPT

## 2023-08-19 PROCEDURE — 86803 HEPATITIS C AB TEST: CPT

## 2023-08-19 PROCEDURE — 36415 COLL VENOUS BLD VENIPUNCTURE: CPT

## 2023-08-19 PROCEDURE — 82570 ASSAY OF URINE CREATININE: CPT

## 2023-08-20 LAB
HCV AB SER QL: NORMAL
HIV 1+2 AB+HIV1 P24 AG SERPL QL IA: NORMAL
HIV 2 AB SERPL QL IA: NORMAL
HIV1 AB SERPL QL IA: NORMAL
HIV1 P24 AG SERPL QL IA: NORMAL

## 2023-08-31 ENCOUNTER — TELEPHONE (OUTPATIENT)
Dept: FAMILY MEDICINE CLINIC | Facility: CLINIC | Age: 53
End: 2023-08-31

## 2023-09-08 ENCOUNTER — OFFICE VISIT (OUTPATIENT)
Dept: FAMILY MEDICINE CLINIC | Facility: CLINIC | Age: 53
End: 2023-09-08
Payer: COMMERCIAL

## 2023-09-08 VITALS
DIASTOLIC BLOOD PRESSURE: 80 MMHG | HEIGHT: 72 IN | OXYGEN SATURATION: 97 % | BODY MASS INDEX: 28.17 KG/M2 | SYSTOLIC BLOOD PRESSURE: 124 MMHG | WEIGHT: 208 LBS | HEART RATE: 88 BPM

## 2023-09-08 DIAGNOSIS — R19.7 DIARRHEA, UNSPECIFIED TYPE: Primary | ICD-10-CM

## 2023-09-08 DIAGNOSIS — R93.5 ABNORMAL ABDOMINAL ULTRASOUND: ICD-10-CM

## 2023-09-08 DIAGNOSIS — E11.9 TYPE 2 DIABETES MELLITUS WITH HEMOGLOBIN A1C GOAL OF LESS THAN 7.0% (HCC): ICD-10-CM

## 2023-09-08 PROCEDURE — 99213 OFFICE O/P EST LOW 20 MIN: CPT | Performed by: PHYSICIAN ASSISTANT

## 2023-09-08 RX ORDER — PEN NEEDLE, DIABETIC 32GX 5/32"
NEEDLE, DISPOSABLE MISCELLANEOUS
Qty: 400 EACH | Refills: 3 | Status: SHIPPED | OUTPATIENT
Start: 2023-09-08

## 2023-09-08 RX ORDER — INSULIN GLARGINE-YFGN 100 [IU]/ML
INJECTION, SOLUTION SUBCUTANEOUS
COMMUNITY
Start: 2023-08-11

## 2023-09-08 RX ORDER — CHOLESTYRAMINE 4 G/9G
1 POWDER, FOR SUSPENSION ORAL
Qty: 60 EACH | Refills: 0 | Status: SHIPPED | OUTPATIENT
Start: 2023-09-08

## 2023-09-08 NOTE — PROGRESS NOTES
Assessment/Plan:    No problem-specific Assessment & Plan notes found for this encounter. Diagnoses and all orders for this visit:    Diarrhea, unspecified type  -     CT abdomen pelvis w contrast; Future  -     cholestyramine (QUESTRAN) 4 g packet; Take 1 packet (4 g total) by mouth 3 (three) times a day with meals    Abnormal abdominal ultrasound  Comments:  US with gallbaldder calculi vs sludge   CT as ordered   Orders:  -     CT abdomen pelvis w contrast; Future    Type 2 diabetes mellitus with hemoglobin A1c goal of less than 7.0% ([facility])  -     Insulin Pen Needle (BD Pen Needle Kelsey U/F) 32G X 4 MM MISC; Use four times daily as directed with insulin pen    Other orders  -     Semglee, yfgn, 100 UNIT/ML SOLN        FUP pending CT results - may need referral to GI vs general surgery  RTC 2 months for DM follow up or sooner if concerns arise     Subjective:      Patient ID: Kimberlyn Dawkins is a 47 y.o. male presents for follow up of diarrhea. Had US done jeanette grande for gallstones vs sludge - recommend CT for further evaluation. He is still continuing with multiple episodes of watery diarrhea per day with associated gas and bloating. His labls are WNL. Denies abdominal pain, N/V. Requesting refill on insulin pen needles. HPI    The following portions of the patient's history were reviewed and updated as appropriate: allergies, current medications, past family history, past medical history, past social history, past surgical history and problem list.    Review of Systems   Constitutional: Negative. HENT: Negative. Respiratory: Negative. Cardiovascular: Negative. Gastrointestinal: Positive for abdominal distention and diarrhea. Negative for blood in stool, nausea, rectal pain and vomiting. Increase flatuence          Objective:      /80   Pulse 88   Ht 6' (1.829 m)   Wt 94.3 kg (208 lb)   SpO2 97%   BMI 28.21 kg/m²          Physical Exam  Vitals reviewed.    Constitutional: General: He is not in acute distress. Appearance: Normal appearance. He is well-developed. HENT:      Head: Normocephalic and atraumatic. Eyes:      Conjunctiva/sclera: Conjunctivae normal.      Pupils: Pupils are equal, round, and reactive to light. Neck:      Thyroid: No thyromegaly. Cardiovascular:      Rate and Rhythm: Normal rate and regular rhythm. Heart sounds: Normal heart sounds. No murmur heard. Pulmonary:      Effort: Pulmonary effort is normal. No respiratory distress. Breath sounds: Normal breath sounds. No wheezing. Abdominal:      General: Bowel sounds are normal. There is no distension. Palpations: Abdomen is soft. There is no mass. Tenderness: There is no abdominal tenderness. Hernia: No hernia is present. Musculoskeletal:         General: Normal range of motion. Cervical back: Normal range of motion and neck supple. Skin:     General: Skin is warm and dry. Neurological:      Mental Status: He is alert and oriented to person, place, and time. Psychiatric:         Mood and Affect: Mood normal.         Behavior: Behavior normal.         Thought Content:  Thought content normal.         Judgment: Judgment normal.

## 2023-09-19 DIAGNOSIS — K80.20 GALLSTONES: Primary | ICD-10-CM

## 2023-09-19 DIAGNOSIS — R19.7 DIARRHEA, UNSPECIFIED TYPE: ICD-10-CM

## 2023-09-19 DIAGNOSIS — R93.2 ABNORMAL CT SCAN, GALLBLADDER: ICD-10-CM

## 2023-10-12 ENCOUNTER — CONSULT (OUTPATIENT)
Dept: SURGERY | Facility: CLINIC | Age: 53
End: 2023-10-12
Payer: COMMERCIAL

## 2023-10-12 VITALS
TEMPERATURE: 97.1 F | DIASTOLIC BLOOD PRESSURE: 84 MMHG | OXYGEN SATURATION: 97 % | BODY MASS INDEX: 27.74 KG/M2 | HEART RATE: 80 BPM | SYSTOLIC BLOOD PRESSURE: 141 MMHG | HEIGHT: 72 IN | WEIGHT: 204.8 LBS

## 2023-10-12 DIAGNOSIS — R19.7 DIARRHEA, UNSPECIFIED TYPE: ICD-10-CM

## 2023-10-12 DIAGNOSIS — K58.0 IRRITABLE BOWEL SYNDROME WITH DIARRHEA: Primary | ICD-10-CM

## 2023-10-12 DIAGNOSIS — R93.2 ABNORMAL CT SCAN, GALLBLADDER: ICD-10-CM

## 2023-10-12 DIAGNOSIS — K80.20 GALLSTONES: ICD-10-CM

## 2023-10-12 PROCEDURE — 99204 OFFICE O/P NEW MOD 45 MIN: CPT | Performed by: SURGERY

## 2023-10-12 RX ORDER — DICYCLOMINE HCL 20 MG
20 TABLET ORAL EVERY 6 HOURS
Qty: 60 TABLET | Refills: 3 | Status: SHIPPED | OUTPATIENT
Start: 2023-10-12

## 2023-10-13 ENCOUNTER — CONSULT (OUTPATIENT)
Dept: GASTROENTEROLOGY | Facility: CLINIC | Age: 53
End: 2023-10-13
Payer: COMMERCIAL

## 2023-10-13 VITALS
SYSTOLIC BLOOD PRESSURE: 134 MMHG | HEIGHT: 72 IN | TEMPERATURE: 96 F | HEART RATE: 80 BPM | RESPIRATION RATE: 16 BRPM | BODY MASS INDEX: 27.74 KG/M2 | OXYGEN SATURATION: 98 % | DIASTOLIC BLOOD PRESSURE: 80 MMHG | WEIGHT: 204.8 LBS

## 2023-10-13 DIAGNOSIS — Z80.0 FAMILY HISTORY OF COLON CANCER: ICD-10-CM

## 2023-10-13 DIAGNOSIS — R19.7 DIARRHEA, UNSPECIFIED TYPE: Primary | ICD-10-CM

## 2023-10-13 DIAGNOSIS — R93.5 ABNORMAL CT OF THE ABDOMEN: ICD-10-CM

## 2023-10-13 DIAGNOSIS — K80.20 CALCULUS OF GALLBLADDER WITHOUT CHOLECYSTITIS WITHOUT OBSTRUCTION: ICD-10-CM

## 2023-10-13 PROCEDURE — 99204 OFFICE O/P NEW MOD 45 MIN: CPT | Performed by: PHYSICIAN ASSISTANT

## 2023-10-13 RX ORDER — CHOLESTYRAMINE 4 G/9G
1 POWDER, FOR SUSPENSION ORAL
Qty: 60 EACH | Refills: 5 | Status: SHIPPED | OUTPATIENT
Start: 2023-10-13

## 2023-10-13 NOTE — PATIENT INSTRUCTIONS
Given your change in bowel habits and diarrhea, as well as your family history of colon cancer, I would recommend the following:   - please have blood work and stool studies completed. - you can use questran powder to help add bulk and form to stools. - you can try the dicyclomine/Bentyl which is an antispasm/cramping pill though sometimes can help with generalized bloating and discomfort and slows down stool output. Please use with caution as this can cause constipation and drowsiness/dizziness. Given the bowel habit change and your family history of colon cancer, we will also proceed with a colonoscopy.

## 2023-10-13 NOTE — PROGRESS NOTES
[facility] - Outpatient Consultation  Gilberto Nobles 47 y.o. male MRN: [id number]  Encounter: [id number]    ASSESSMENT AND PLAN:      1. Diarrhea, unspecified type    This diabetic pt presents with hx of months of intractable diarrhea. No obvious precipitants. We reviewed differential diagnosis includes infectious etiology, IBS, IBD, malabsorptive pathology, celiac disease, EPI, malignancy, etc.    We did review recommendation for comprehensive evaluation to include blood work and stool testing, and a colonoscopy. In the setting of a macroscopically normal-appearing colon, recommend random biopsies for microscopic colitis. At this time, discussed with patient it is reasonable to continue to utilize cholestyramine as needed to help control diarrhea. General surgery did provide a prescription for antispasmodic, and patient can trial this to see if that helps with symptoms. He can also look into the low FODMAP diet. The patient was counseled on possible risks of endoscopic procedure to include but not limited to bleeding, infection, and perforation. The patient demonstrates understanding and is in agreement with proceeding with endoscopic evaluation as planned. We will plan to follow up after endoscopic evaluation.     - Ambulatory Referral to Gastroenterology  - Ova and parasite examination; Future  - Pancreatic elastase, fecal; Future  - Stool Enteric Bacterial Panel by PCR; Future  - Tissue transglutaminase, IgA; Future  - IgA; Future  - Giardia antigen; Future  - Clostridium difficile toxin by PCR with EIA; Future  - Calprotectin,Fecal; Future  - Fecal fat, qualitative; Future  - C-reactive protein; Future  - Colonoscopy; Future  - polyethylene glycol (GOLYTELY) 4000 mL solution; Take 4,000 mL by mouth once for 1 dose  Dispense: 4000 mL; Refill: 0  - cholestyramine (QUESTRAN) 4 g packet;  Take 1 packet (4 g total) by mouth 3 (three) times a day with meals  Dispense: 60 each; Refill: 5    2. Family history of colon cancer    Noted in first-degree relative. We did review given this, colonoscopy would be the screening modality of choice for colon cancer screening. He did have a negative Cologuard in 2021.    - Colonoscopy; Future  - polyethylene glycol (GOLYTELY) 4000 mL solution; Take 4,000 mL by mouth once for 1 dose  Dispense: 4000 mL; Refill: 0    3. Abnormal CT of the abdomen    Patient with imaging findings consistent with gallbladder pathology such as cholelithiasis or sludge. The CT also incidentally commented on possible appendiceal abnormality, though patient has no evidence of lower abdominal pain and presentation does not appear consistent with acute appendicitis. I will review this with general surgery. 4. Calculus of gallbladder without cholecystitis without obstruction    Noted, he was just evaluated by general surgery for gallbladder findings. His constellation of symptoms is not typical for symptomatic gallstones or biliary colic, though we will continue to monitor closely. We will follow up after evaluation as above.  ______________________________________________________________________    HPI: Patient is a 47 y.o. male who presents today for a consultation regarding diarrhea. Pmhx sig for DM2, HTN, PONCHO, stasis dermatitis, cholelithiasis, MDD. Patient is new to clinic. He is here for evaluation of chronic diarrhea, which has been ongoing for several months at least.  In his past, he is imminently dealt with diarrhea though eventually eliminated his coffee creamer and his symptoms abated. He denies any obvious precipitants to his persistent diarrhea, no obvious medication triggers, change in diet, illnesses, travel, exposure to sick contacts. He shares that he will have anywhere from 3-10 bowel movements in a day. He notes that they are never completely formed, though range from soft to liquid. He denies any BRBPR or melena.   He endorses early morning awakening to have a bowel movement. He endorses some abdominal cramping prior to defecation. He endorses rumbling in his stomach into his rectum. He endorses excess flatus as well. He denies any abnormal weight loss in the past 6 months. He has a family history of colon cancer in his mother, dx in her 62s. He had a Cologuard completed which was negative in 2021. He had a CT scan completed by his PCP which was unremarkable. He was started on Questran which helped to add some bulk to his stool though did not eliminate symptoms. Pertaining to his upper GI tract, denies any significant heartburn, indigestion, nausea, vomiting, dysphagia or odynophagia. His appetite is good. He denies early satiety. NSAIDs: none   Etoh: occasional   Tobacco: vapes nictonine     09/2023: Significant gallbladder abnormality, nonspecific focal free fluid in the pelvis just above the bladder on the right, may be in the region of the distal is appendiceal tip which shows minimal wall enhancement without thickening  01/2021: Cologuard negative     Endoscopic history:   none    Review of Systems   Otherwise Per HPI    Historical Information   Past Medical History:   Diagnosis Date    Depression     Diabetes mellitus ([address])      History reviewed. No pertinent surgical history.   Social History   Social History     Substance and Sexual Activity   Alcohol Use Yes    Comment: occasionally     Social History     Substance and Sexual Activity   Drug Use Never     Social History     Tobacco Use   Smoking Status Former   Smokeless Tobacco Never   Tobacco Comments    quite 8 years ago     Family History   Problem Relation Age of Onset    Colon cancer Mother     Heart disease Father        Meds/Allergies       Current Outpatient Medications:     cholestyramine (QUESTRAN) 4 g packet    dicyclomine (BENTYL) 20 mg tablet    gabapentin (NEURONTIN) 100 mg capsule    insulin glargine (LANTUS) 100 units/mL subcutaneous injection    insulin lispro (HumaLOG Mauricio KwikPen) 100 units/mL injection pen    Insulin Pen Needle (BD Pen Needle Kelsey U/F) 32G X 4 MM MISC    Semglee, yfgn, 100 UNIT/ML SOLN    lisinopril (ZESTRIL) 10 mg tablet    Allergies   Allergen Reactions    Influenza Virus Vaccine Nausea Only     Made sick in past     Objective     Blood pressure 134/80, pulse 80, temperature (!) 96 °F (35.6 °C), temperature source Tympanic, resp. rate 16, height 6' (1.829 m), weight 92.9 kg (204 lb 12.8 oz), SpO2 98 %. Body mass index is 27.78 kg/m². Physical Exam  Vitals and nursing note reviewed. Constitutional:       Appearance: Normal appearance. HENT:      Head: Normocephalic and atraumatic. Eyes:      General: No scleral icterus. Conjunctiva/sclera: Conjunctivae normal.   Cardiovascular:      Rate and Rhythm: Normal rate. Pulmonary:      Effort: Pulmonary effort is normal. No respiratory distress. Abdominal:      General: Bowel sounds are normal. There is no distension. Palpations: Abdomen is soft. There is no mass. Tenderness: There is no abdominal tenderness. There is no right CVA tenderness or guarding. Skin:     General: Skin is warm and dry. Coloration: Skin is not jaundiced. Neurological:      General: No focal deficit present. Mental Status: He is alert and oriented to person, place, and time. Psychiatric:         Mood and Affect: Mood normal.         Behavior: Behavior normal.        Lab Results:   No visits with results within 1 Day(s) from this visit.    Latest known visit with results is:   Appointment on 08/19/2023   Component Date Value    HIV-1 p24 Antigen 08/19/2023 Non-Reactive     HIV-1 Antibody 08/19/2023 Non-Reactive     HIV-2 Antibody 08/19/2023 Non-Reactive     HIV Ag-Ab 5th Gen 08/19/2023 Non-Reactive     Hepatitis C Ab 08/19/2023 Non-reactive     Creatinine, Ur 08/19/2023 44.1     Albumin,U,Random 08/19/2023 110.0 (H)     Albumin Creat Ratio 08/19/2023 249 (H)     WBC 08/19/2023 9.10 RBC 08/19/2023 5.45     Hemoglobin 08/19/2023 15.0     Hematocrit 08/19/2023 46.0     MCV 08/19/2023 84     MCH 08/19/2023 27.5     MCHC 08/19/2023 32.6     RDW 08/19/2023 13.4     MPV 08/19/2023 8.7 (L)     Platelets 56/36/3901 262     nRBC 08/19/2023 0     Neutrophils Relative 08/19/2023 76 (H)     Immat GRANS % 08/19/2023 0     Lymphocytes Relative 08/19/2023 15     Monocytes Relative 08/19/2023 7     Eosinophils Relative 08/19/2023 1     Basophils Relative 08/19/2023 1     Neutrophils Absolute 08/19/2023 6.99     Immature Grans Absolute 08/19/2023 0.03     Lymphocytes Absolute 08/19/2023 1.36     Monocytes Absolute 08/19/2023 0.60     Eosinophils Absolute 08/19/2023 0.07     Basophils Absolute 08/19/2023 0.05     Sodium 08/19/2023 138     Potassium 08/19/2023 4.0     Chloride 08/19/2023 103     CO2 08/19/2023 29     ANION GAP 08/19/2023 6     BUN 08/19/2023 19     Creatinine 08/19/2023 0.88     Glucose 08/19/2023 148 (H)     Calcium 08/19/2023 9.8     AST 08/19/2023 17     ALT 08/19/2023 28     Alkaline Phosphatase 08/19/2023 48     Total Protein 08/19/2023 7.5     Albumin 08/19/2023 4.3     Total Bilirubin 08/19/2023 0.36     eGFR 08/19/2023 98     TSH 3RD GENERATON 08/19/2023 0.968     Cholesterol 08/19/2023 128     Triglycerides 08/19/2023 48     HDL, Direct 08/19/2023 43     LDL Calculated 08/19/2023 75     Non-HDL-Chol (CHOL-HDL) 08/19/2023 85      Radiology Results:   No results found. Itzel Parra PA-C    **Please note:  Dictation voice to text software may have been used in the creation of this record. Occasional wrong word or “sound alike” substitutions may have occurred due to the inherent limitations of voice recognition software. Read the chart carefully and recognize, using context, where substitutions have occurred. **

## 2023-10-14 PROBLEM — K80.20 GALLSTONES: Status: ACTIVE | Noted: 2023-10-14

## 2023-10-15 NOTE — ASSESSMENT & PLAN NOTE
Patient with persistent symptoms including bloating diarrhea. Patient trialing this any specific foods. No specific exacerbating factors. Based on patient's history and physical exam suspect patient's symptoms could be secondary to irritable bowel syndrome. Other potential diagnoses could be underlying colitis versus microcytic colitis or potentially inflammatory bowel disease. We will start the patient on Bentyl for now. Also place referral for GI for further potential work-up including stool studies and/or colonoscopy if necessary.

## 2023-10-15 NOTE — ASSESSMENT & PLAN NOTE
Diarrhea of unknown etiology. Does not appear secondary to gallstones. Patient has no no associate abdominal pain. Denies any mucus or hematochezia. Does express some sense of urgency, specially after eating. No alleviating factors. Signs symptoms could be secondary to irritable bowel syndrome, mild absorption issues, underlying colitis, inflammatory bowel disease. For now we will start Bentyl. Again referral placed to GI.

## 2023-10-15 NOTE — ASSESSMENT & PLAN NOTE
Identity material found in the gallbladder on CT scan suggesting potential gallstones. Patient has no abdominal pain. And specifically he has no right upper quadrant or epigastric pain. He does have some bloating and diarrhea. Food does not appear to make his symptoms worse. Currently does not appear that he has signs or symptoms are suggestive of gallstone disease as the underlying etiology.

## 2023-10-15 NOTE — PROGRESS NOTES
Assessment/Plan:    Irritable bowel syndrome with diarrhea  Patient with persistent symptoms including bloating diarrhea. Patient trialing this any specific foods. No specific exacerbating factors. Based on patient's history and physical exam suspect patient's symptoms could be secondary to irritable bowel syndrome. Other potential diagnoses could be underlying colitis versus microcytic colitis or potentially inflammatory bowel disease. We will start the patient on Bentyl for now. Also place referral for GI for further potential work-up including stool studies and/or colonoscopy if necessary. Diarrhea  Diarrhea of unknown etiology. Does not appear secondary to gallstones. Patient has no no associate abdominal pain. Denies any mucus or hematochezia. Does express some sense of urgency, specially after eating. No alleviating factors. Signs symptoms could be secondary to irritable bowel syndrome, mild absorption issues, underlying colitis, inflammatory bowel disease. For now we will start Bentyl. Again referral placed to GI. Gallstones  Identity material found in the gallbladder on CT scan suggesting potential gallstones. Patient has no abdominal pain. And specifically he has no right upper quadrant or epigastric pain. He does have some bloating and diarrhea. Food does not appear to make his symptoms worse. Currently does not appear that he has signs or symptoms are suggestive of gallstone disease as the underlying etiology. Diagnoses and all orders for this visit:    Irritable bowel syndrome with diarrhea  -     Ambulatory Referral to Gastroenterology; Future    Gallstones  -     Ambulatory Referral to General Surgery    Abnormal CT scan, gallbladder  -     Ambulatory Referral to General Surgery    Diarrhea, unspecified type  -     Ambulatory Referral to General Surgery  -     dicyclomine (BENTYL) 20 mg tablet;  Take 1 tablet (20 mg total) by mouth every 6 (six) hours  -     Ambulatory Referral to Gastroenterology; Future          Subjective:      Patient ID: Antonio Lopez is a 47 y.o. male. 58-year-old gentleman with known diabetes, PONCHO, hypertension, presents to the office today in consultation for evaluation of persistent diarrhea. Patient was seen by his PCP for chronic ongoing diarrhea. Patient states that he has loose stools anywhere from 3-10 times per day. Sometimes they are almost pure water. Denies any blood or mucus. Denies any abdominal cramping or pain of any kind. No specific alleviating or exacerbating factors however he did notice things did improve when he stopped drinking coffee mate creamer and instead uses half-and-half. However recently his symptoms have returned. He does have nocturnal bowel movements. He does state there is some sense of urgency of having to go to the restroom especially after eating. However this is not associated with any cramping. Does have a family history of colorectal cancer. Patient has been placed on Questran with some relief. Patient was ordered a CT scan which did not not show anything specific to his colon or small bowel yet it did show some density material within the gallbladder which could represent noncalcified gallstones. Thus patient was sent to general surgery for further evaluation. Patient does have some sense of bloating but really no other symptoms aside from the diarrhea. Denies any upper abdominal right upper quadrant pain specifically. The following portions of the patient's history were reviewed and updated as appropriate: He  has a past medical history of Depression and Diabetes mellitus ([address]).   He   Patient Active Problem List    Diagnosis Date Noted    Gallstones 10/14/2023    Diarrhea 10/12/2023    Irritable bowel syndrome with diarrhea 10/12/2023    PONCHO (obstructive sleep apnea) 08/11/2023    Stasis dermatitis of both legs 06/30/2021    HTN, goal below 140/80 08/20/2012    Major depressive disorder 10/19/2011    Type 2 diabetes mellitus with hemoglobin A1c goal of less than 7.0% ([address] [facility]) 10/29/2009    History of tobacco use 07/14/2009     He  has no past surgical history on file. His family history includes Colon cancer in his mother; Heart disease in his father. He  reports that he has quit smoking. He has never used smokeless tobacco. He reports current alcohol use. He reports that he does not use drugs. Current Outpatient Medications   Medication Sig Dispense Refill    dicyclomine (BENTYL) 20 mg tablet Take 1 tablet (20 mg total) by mouth every 6 (six) hours 60 tablet 3    cholestyramine (QUESTRAN) 4 g packet Take 1 packet (4 g total) by mouth 3 (three) times a day with meals 60 each 5    gabapentin (NEURONTIN) 100 mg capsule Take 1 capsule (100 mg total) by mouth daily as needed (pain) 90 capsule 2    insulin glargine (LANTUS) 100 units/mL subcutaneous injection Inject 45 Units under the skin daily 40.5 mL 2    insulin lispro (HumaLOG Mauricio KwikPen) 100 units/mL injection pen Take 1 unit insulin for every 10g carbohydrates in meals. 45 mL 3    Insulin Pen Needle (BD Pen Needle Kelsey U/F) 32G X 4 MM MISC Use four times daily as directed with insulin pen 400 each 3    lisinopril (ZESTRIL) 10 mg tablet Take 1 tablet (10 mg total) by mouth daily 90 tablet 1    polyethylene glycol (GOLYTELY) 4000 mL solution Take 4,000 mL by mouth once for 1 dose 4000 mL 0    Semglee, yfgn, 100 UNIT/ML SOLN        No current facility-administered medications for this visit. He is allergic to influenza virus vaccine. .    Review of Systems      Review of systems completed, all negative so is no HPI. Review of systems completed, all negative except as per HPI. Objective:      /84   Pulse 80   Temp (!) 97.1 °F (36.2 °C) (Tympanic)   Ht 6' (1.829 m)   Wt 92.9 kg (204 lb 12.8 oz)   SpO2 97%   BMI 27.78 kg/m²          Physical Exam  Vitals reviewed. Constitutional:       General: He is not in acute distress. Appearance: Normal appearance. He is not ill-appearing, toxic-appearing or diaphoretic. HENT:      Head: Normocephalic and atraumatic. Right Ear: External ear normal.      Left Ear: External ear normal.   Eyes:      General: No scleral icterus. Right eye: No discharge. Left eye: No discharge. Cardiovascular:      Rate and Rhythm: Normal rate and regular rhythm. Heart sounds: Normal heart sounds. No friction rub. No gallop. Pulmonary:      Effort: Pulmonary effort is normal. No respiratory distress. Breath sounds: Normal breath sounds. No stridor. No wheezing or rhonchi. Abdominal:      General: Abdomen is flat. There is no distension. Palpations: Abdomen is soft. There is no mass. Tenderness: There is no abdominal tenderness. There is no rebound. Hernia: No hernia is present. Musculoskeletal:         General: No swelling or deformity. Cervical back: Normal range of motion. Right lower leg: No edema. Left lower leg: No edema. Skin:     General: Skin is warm. Coloration: Skin is not jaundiced or pale. Findings: No bruising or erythema. Neurological:      General: No focal deficit present. Mental Status: He is alert and oriented to person, place, and time. Cranial Nerves: No cranial nerve deficit. Psychiatric:         Mood and Affect: Mood normal.         Behavior: Behavior normal.         Thought Content: Thought content normal.         Judgment: Judgment normal.           Imaging:    CT scan report from outside facility dated September 19, 2023 was personally reviewed by me. Notes:    PCP note dated September 8, 2023 was personally reviewed by me.

## 2023-10-19 DIAGNOSIS — E11.9 TYPE 2 DIABETES MELLITUS WITHOUT COMPLICATION, WITH LONG-TERM CURRENT USE OF INSULIN (HCC): ICD-10-CM

## 2023-10-19 DIAGNOSIS — Z79.4 TYPE 2 DIABETES MELLITUS WITHOUT COMPLICATION, WITH LONG-TERM CURRENT USE OF INSULIN (HCC): ICD-10-CM

## 2023-10-19 RX ORDER — INSULIN LISPRO 100 [IU]/ML
INJECTION, SOLUTION SUBCUTANEOUS
Qty: 45 ML | Refills: 3 | Status: SHIPPED | OUTPATIENT
Start: 2023-10-19

## 2023-11-21 RX ORDER — SODIUM CHLORIDE, SODIUM LACTATE, POTASSIUM CHLORIDE, CALCIUM CHLORIDE 600; 310; 30; 20 MG/100ML; MG/100ML; MG/100ML; MG/100ML
125 INJECTION, SOLUTION INTRAVENOUS CONTINUOUS
Status: CANCELLED | OUTPATIENT
Start: 2023-11-21

## 2023-11-22 ENCOUNTER — HOSPITAL ENCOUNTER (OUTPATIENT)
Dept: PERIOP | Facility: HOSPITAL | Age: 53
Setting detail: OUTPATIENT SURGERY
Discharge: HOME | End: 2023-11-22
Payer: COMMERCIAL

## 2023-11-22 ENCOUNTER — ANESTHESIA EVENT (OUTPATIENT)
Dept: PERIOP | Facility: HOSPITAL | Age: 53
End: 2023-11-22

## 2023-11-22 ENCOUNTER — ANESTHESIA (OUTPATIENT)
Dept: PERIOP | Facility: HOSPITAL | Age: 53
End: 2023-11-22

## 2023-11-22 VITALS
RESPIRATION RATE: 18 BRPM | OXYGEN SATURATION: 94 % | TEMPERATURE: 96.8 F | BODY MASS INDEX: 27.63 KG/M2 | WEIGHT: 204 LBS | SYSTOLIC BLOOD PRESSURE: 115 MMHG | HEIGHT: 72 IN | HEART RATE: 78 BPM | DIASTOLIC BLOOD PRESSURE: 68 MMHG

## 2023-11-22 DIAGNOSIS — R19.7 DIARRHEA, UNSPECIFIED TYPE: ICD-10-CM

## 2023-11-22 DIAGNOSIS — Z80.0 FAMILY HISTORY OF COLON CANCER: ICD-10-CM

## 2023-11-22 LAB — GLUCOSE SERPL-MCNC: 160 MG/DL (ref 65–140)

## 2023-11-22 PROCEDURE — 82948 REAGENT STRIP/BLOOD GLUCOSE: CPT

## 2023-11-22 PROCEDURE — 45385 COLONOSCOPY W/LESION REMOVAL: CPT | Performed by: STUDENT IN AN ORGANIZED HEALTH CARE EDUCATION/TRAINING PROGRAM

## 2023-11-22 PROCEDURE — 45380 COLONOSCOPY AND BIOPSY: CPT | Performed by: STUDENT IN AN ORGANIZED HEALTH CARE EDUCATION/TRAINING PROGRAM

## 2023-11-22 PROCEDURE — 88305 TISSUE EXAM BY PATHOLOGIST: CPT | Performed by: PATHOLOGY

## 2023-11-22 RX ORDER — PROPOFOL 10 MG/ML
INJECTION, EMULSION INTRAVENOUS AS NEEDED
Status: DISCONTINUED | OUTPATIENT
Start: 2023-11-22 | End: 2023-11-22

## 2023-11-22 RX ORDER — SODIUM CHLORIDE, SODIUM LACTATE, POTASSIUM CHLORIDE, CALCIUM CHLORIDE 600; 310; 30; 20 MG/100ML; MG/100ML; MG/100ML; MG/100ML
125 INJECTION, SOLUTION INTRAVENOUS CONTINUOUS
Status: DISCONTINUED | OUTPATIENT
Start: 2023-11-22 | End: 2023-11-26 | Stop reason: HOSPADM

## 2023-11-22 RX ORDER — LIDOCAINE HYDROCHLORIDE 10 MG/ML
INJECTION, SOLUTION EPIDURAL; INFILTRATION; INTRACAUDAL; PERINEURAL AS NEEDED
Status: DISCONTINUED | OUTPATIENT
Start: 2023-11-22 | End: 2023-11-22

## 2023-11-22 RX ORDER — SODIUM CHLORIDE, SODIUM LACTATE, POTASSIUM CHLORIDE, CALCIUM CHLORIDE 600; 310; 30; 20 MG/100ML; MG/100ML; MG/100ML; MG/100ML
INJECTION, SOLUTION INTRAVENOUS CONTINUOUS PRN
Status: DISCONTINUED | OUTPATIENT
Start: 2023-11-22 | End: 2023-11-22

## 2023-11-22 RX ADMIN — LIDOCAINE HYDROCHLORIDE 50 MG: 10 INJECTION, SOLUTION EPIDURAL; INFILTRATION; INTRACAUDAL; PERINEURAL at 12:43

## 2023-11-22 RX ADMIN — SODIUM CHLORIDE, SODIUM LACTATE, POTASSIUM CHLORIDE, AND CALCIUM CHLORIDE: .6; .31; .03; .02 INJECTION, SOLUTION INTRAVENOUS at 12:40

## 2023-11-22 RX ADMIN — PROPOFOL 100 MG: 10 INJECTION, EMULSION INTRAVENOUS at 12:43

## 2023-11-22 RX ADMIN — PROPOFOL 150 MCG/KG/MIN: 10 INJECTION, EMULSION INTRAVENOUS at 12:46

## 2023-11-22 RX ADMIN — PROPOFOL 50 MG: 10 INJECTION, EMULSION INTRAVENOUS at 12:44

## 2023-11-22 RX ADMIN — SODIUM CHLORIDE, SODIUM LACTATE, POTASSIUM CHLORIDE, AND CALCIUM CHLORIDE 125 ML/HR: .6; .31; .03; .02 INJECTION, SOLUTION INTRAVENOUS at 11:28

## 2023-11-22 NOTE — ANESTHESIA PREPROCEDURE EVALUATION
Procedure:  COLONOSCOPY    Relevant Problems   CARDIO   (+) HTN, goal below 140/80      ENDO   (+) Type 2 diabetes mellitus with hemoglobin A1c goal of less than 7.0% (HCC)      NEURO/PSYCH   (+) Major depressive disorder      PULMONARY   (+) PONCHO (obstructive sleep apnea)        Lab Results   Component Value Date    WBC 9.10 08/19/2023    HGB 15.0 08/19/2023    HCT 46.0 08/19/2023    MCV 84 08/19/2023     08/19/2023     Lab Results   Component Value Date    SODIUM 138 08/19/2023    K 4.0 08/19/2023     08/19/2023    CO2 29 08/19/2023    BUN 19 08/19/2023    CREATININE 0.88 08/19/2023    GLUC 148 (H) 08/19/2023    CALCIUM 9.8 08/19/2023     No results found for: "INR", "PROTIME"  Lab Results   Component Value Date    HGBA1C 8.8 (A) 08/11/2023            Physical Exam    Airway    Mallampati score: II  TM Distance: >3 FB  Neck ROM: full     Dental   Comment: Diffusely discolored, eroded and chipped teeth. Patient states he needs dental work on his front upper right incisor -- has ortega in place. Not loose. Cardiovascular      Pulmonary      Other Findings        Anesthesia Plan  ASA Score- 2     Anesthesia Type- IV sedation with anesthesia with ASA Monitors. Additional Monitors:     Airway Plan:            Plan Factors-Exercise tolerance (METS): >4 METS. Chart reviewed. Patient summary reviewed. Patient is a current smoker (+vaping). Patient smoked on day of surgery. Obstructive sleep apnea risk education given perioperatively. Induction-     Postoperative Plan-     Informed Consent- Anesthetic plan and risks discussed with patient. I personally reviewed this patient with the CRNA. Discussed and agreed on the Anesthesia Plan with the CRNA. Yobani Jauregui

## 2023-11-22 NOTE — H&P
[facility]  History & Physical     PATIENT INFO     Name: Helene Mehta  YOB: 1970   Age: 47 y.o. Sex: male   MRN: [id number]     HISTORY OF PRESENT ILLNESS     Helene Mehta is a 47y.o. year old male who presents for screening colonoscopy, family history of colon cancer, diarrhea. No prior colonoscopy. Not on antithrombotics or anticoagulants. REVIEW OF SYSTEMS     Per the HPI, and otherwise unremarkable. Historical Information   Past Medical History:   Diagnosis Date    Depression     Diabetes mellitus ([address])      History reviewed. No pertinent surgical history. Social History   Social History     Substance and Sexual Activity   Alcohol Use Yes    Comment: occasionally     Social History     Substance and Sexual Activity   Drug Use Never     Social History     Tobacco Use   Smoking Status Former   Smokeless Tobacco Never   Tobacco Comments    quit 8 years ago        Stated he VAPED this morning 11/22/23 at 0930      Family History   Problem Relation Age of Onset    Colon cancer Mother     Heart disease Father         MEDICATIONS & ALLERGIES     Current Outpatient Medications   Medication Instructions    cholestyramine (QUESTRAN) 4 g, Oral, 3 times daily with meals    dicyclomine (BENTYL) 20 mg, Oral, Every 6 hours    gabapentin (NEURONTIN) 100 mg, Oral, Daily PRN    insulin glargine (LANTUS) 45 Units, Subcutaneous, Daily    insulin lispro (HumaLOG Mauricio KwikPen) 100 units/mL injection pen Take 1 unit insulin for every 10g carbohydrates in meals. Insulin Pen Needle (BD Pen Needle Kelsey U/F) 32G X 4 MM MISC Use four times daily as directed with insulin pen    lisinopril (ZESTRIL) 10 mg, Oral, Daily    polyethylene glycol (GOLYTELY) 4000 mL solution 4,000 mL, Oral, Once    Semglee, yfgn, 100 UNIT/ML SOLN No dose, route, or frequency recorded.      Allergies   Allergen Reactions    Influenza Virus Vaccine Nausea Only     Made sick in past        PHYSICAL EXAM      Objective   Blood pressure 137/74, pulse 74, temperature (!) 96.1 °F (35.6 °C), temperature source Tympanic, resp. rate 18, height 6' (1.829 m), weight 92.5 kg (204 lb), SpO2 93 %. Body mass index is 27.67 kg/m². General Appearance:   Alert, cooperative, no distress   Lungs:   Equal chest rise, respirations unlabored    Heart:   Regular rate and rhythm   Abdomen:   Soft, non-tender, non-distended; normal bowel sounds; no masses, no organomegaly    Extremities:   No edema       ASSESSMENT & PLAN     This is a 47y.o. year old male here for screening colonoscopy, and he is stable and optimized for his procedure. Caleen Goodpasture, D.O. [address] [facility]  Division of Gastroenterology & Hepatology  Available on [email or web address]    ** Please Note: This note is constructed using a voice recognition dictation system.  **

## 2023-11-22 NOTE — ANESTHESIA POSTPROCEDURE EVALUATION
Post-Op Assessment Note    CV Status:  Stable    Pain management: satisfactory to patient       Mental Status:  Awake and sleepy   Hydration Status:  Euvolemic   PONV Controlled:  Controlled   Airway Patency:  Patent     Post Op Vitals Reviewed: Yes    No anethesia notable event occurred.     Staff: CRNA               BP   94/56   Temp     Pulse  74   Resp   16   SpO2   99

## 2023-12-04 PROCEDURE — 88305 TISSUE EXAM BY PATHOLOGIST: CPT | Performed by: PATHOLOGY

## 2024-01-08 ENCOUNTER — TELEPHONE (OUTPATIENT)
Dept: GASTROENTEROLOGY | Facility: CLINIC | Age: 54
End: 2024-01-08

## 2024-01-08 DIAGNOSIS — R19.7 DIARRHEA, UNSPECIFIED TYPE: ICD-10-CM

## 2024-01-08 DIAGNOSIS — R93.5 ABNORMAL FINDINGS ON DIAGNOSTIC IMAGING OF ABDOMEN: Primary | ICD-10-CM

## 2024-01-08 NOTE — TELEPHONE ENCOUNTER
I spoke to my surgical colleague Dr. Lin regarding pt's appendix abnormality on CT scan from the fall. This CT was completed for diarrhea though incidentally he had some comment of appendix wall abnormality, though no clinical signs of appendicitis. The recommendations are as follows:   - can we obtain CD of the previous CT scan so our surgical colleagues can look at this in greater detail?  - repeat CT now to evaluate for persistent appendix abnormalities    Sometimes abnormalities of the appendix can present as diarrhea (carcinoid growth etc) and repeating CT scan would be beneficial for evaluation and is recommendation of surgical team.

## 2024-02-12 ENCOUNTER — OFFICE VISIT (OUTPATIENT)
Dept: GASTROENTEROLOGY | Facility: CLINIC | Age: 54
End: 2024-02-12
Payer: COMMERCIAL

## 2024-02-12 VITALS
OXYGEN SATURATION: 98 % | TEMPERATURE: 98.1 F | WEIGHT: 199.6 LBS | HEIGHT: 72 IN | SYSTOLIC BLOOD PRESSURE: 142 MMHG | RESPIRATION RATE: 16 BRPM | HEART RATE: 91 BPM | BODY MASS INDEX: 27.04 KG/M2 | DIASTOLIC BLOOD PRESSURE: 80 MMHG

## 2024-02-12 DIAGNOSIS — E11.9 TYPE 2 DIABETES MELLITUS WITH HEMOGLOBIN A1C GOAL OF LESS THAN 7.0% (HCC): ICD-10-CM

## 2024-02-12 DIAGNOSIS — K58.0 IRRITABLE BOWEL SYNDROME WITH DIARRHEA: Primary | ICD-10-CM

## 2024-02-12 DIAGNOSIS — Z80.0 FAMILY HISTORY OF COLON CANCER IN MOTHER: ICD-10-CM

## 2024-02-12 DIAGNOSIS — Z86.010 HISTORY OF COLON POLYPS: ICD-10-CM

## 2024-02-12 DIAGNOSIS — R19.7 DIARRHEA, UNSPECIFIED TYPE: ICD-10-CM

## 2024-02-12 PROBLEM — Z86.0100 HISTORY OF COLON POLYPS: Status: ACTIVE | Noted: 2024-02-12

## 2024-02-12 PROCEDURE — 99214 OFFICE O/P EST MOD 30 MIN: CPT | Performed by: STUDENT IN AN ORGANIZED HEALTH CARE EDUCATION/TRAINING PROGRAM

## 2024-02-12 NOTE — PROGRESS NOTES
[facility]  Outpatient Follow-up  Encounter: [id number]    PATIENT INFO     Name: Christopher Zuluaga  YOB: 1970   Age: 53 y.o.   Sex: male   MRN: [id number]    ASSESSMENT & PLAN     Christopher Zuluaga is a 53 y.o. male with likely IBS with diarrhea, diabetes.  His diarrhea is improved but does continue to have symptoms.  He does have associated abdominal pain which relieves with diarrhea indicating possible IBS.  He could also have diabetic diarrhea given his poorly controlled diabetes.  Other potential etiologies include SIBO.  Abnormal CT noted although overall low suspicion for carcinoid.    Continue cholestyramine as needed  We will trial course of rifaximin for IBS with diarrhea and possible SIBO  Emphasized importance of better glycemic control  Reviewed previous orders for stool studies and repeat CT; however, patient would like to hold off at this time given his financial constraints  If symptoms persist, would advise patient getting these completed  Repeat colonoscopy in 5 years for history of colon polyps and family history of colon cancer (due in 2028)    1. Irritable bowel syndrome with diarrhea    2. Diarrhea, unspecified type    3. Type 2 diabetes mellitus with hemoglobin A1c goal of less than 7.0% ([facility])    4. Family history of colon cancer in mother    5. History of colon polyps      No orders of the defined types were placed in this encounter.      FOLLOW-UP: 6 months    HISTORY OF PRESENT ILLNESS       Christopher Zuluaga is a 53 y.o. male who presents to GI office for follow-up diarrhea.  Patient reports that his symptoms have improved overall but does continue to have loose stools.  He does have some associated abdominal pain with this but does have relief upon having bowel movement.  Does admit to bloating and gassiness as well.  Possible underlying SIBO.  Previous CT findings reviewed with the patient along with recent colonoscopy results.  No evidence of  microscopic colitis.  He does have history of diabetes and last A1c was elevated at 8.5.  He is trying to make efforts at changing his diet to better control this.  He was ordered stool studies and repeat CT scan but patient has not yet got these completed.  He reports that the costs are adding up and would like to hold off on these for now.  Overall he does feel that his diarrhea has improved.     ENDOSCOPIC HISTORY     UPPER ENDOSCOPY: None    COLONOSCOPY: 2023 with single subcentimeter tubular adenoma, biopsies negative for microscopic colitis (next due in 2028 for history of colon polyps and family history of colon cancer in mother)    REVIEW OF SYSTEMS     CONSTITUTIONAL: Denies any fever, chills, rigors, and weight loss  HEENT: No earache or tinnitus, denies hearing loss or visual disturbances  CARDIOVASCULAR: No chest pain or palpitations  RESPIRATORY: Denies any cough, hemoptysis, shortness of breath or dyspnea on exertion  GASTROINTESTINAL: As noted in the History of Present Illness  GENITOURINARY: No problems with urination, denies any hematuria or dysuria  NEUROLOGIC: No dizziness or vertigo, denies headaches   MUSCULOSKELETAL: Denies any muscle or joint pain   SKIN: Denies skin rashes or itching  ENDOCRINE: Denies excessive thirst, denies intolerance to heat or cold  PSYCHOSOCIAL: Denies depression or anxiety, denies any recent memory loss     Historical Information   Past Medical History:   Diagnosis Date    Depression     Diabetes mellitus (HCC)      History reviewed. No pertinent surgical history.  Social History   Social History     Substance and Sexual Activity   Alcohol Use Yes    Comment: occasionally     Social History     Substance and Sexual Activity   Drug Use Never     Social History     Tobacco Use   Smoking Status Former   Smokeless Tobacco Never   Tobacco Comments    quit 8 years ago        Stated he VAPED this morning 11/22/23 at 0930      Family History   Problem Relation Age of Onset     Colon cancer Mother     Heart disease Father        MEDICATIONS & ALLERGIES     Current Outpatient Medications   Medication Instructions    cholestyramine (QUESTRAN) 4 g, Oral, 3 times daily with meals    dicyclomine (BENTYL) 20 mg, Oral, Every 6 hours    gabapentin (NEURONTIN) 100 mg, Oral, Daily PRN    insulin glargine (LANTUS) 45 Units, Subcutaneous, Daily    insulin lispro (HumaLOG Mauricio KwikPen) 100 units/mL injection pen Take 1 unit insulin for every 10g carbohydrates in meals.    Insulin Pen Needle (BD Pen Needle Kelsey U/F) 32G X 4 MM MISC Use four times daily as directed with insulin pen    lisinopril (ZESTRIL) 10 mg, Oral, Daily    rifaximin (XIFAXAN) 550 mg, Oral, Every 8 hours scheduled    Semglee, yfgn, 100 UNIT/ML SOLN No dose, route, or frequency recorded.     Allergies   Allergen Reactions    Influenza Virus Vaccine Nausea Only     Made sick in past       PHYSICAL EXAM      Objective   Blood pressure 142/80, pulse 91, temperature 98.1 °F (36.7 °C), temperature source Tympanic, resp. rate 16, height 6' (1.829 m), weight 90.5 kg (199 lb 9.6 oz), SpO2 98%. Body mass index is 27.07 kg/m².    General Appearance:   Alert, cooperative, no distress   HEENT:   Normocephalic, atraumatic, anicteric     Neck:   Supple, symmetrical, trachea midline   Lungs:   Equal chest rise, respirations unlabored    Heart:   Regular rate and rhythm   Abdomen:   Soft, non-tender, non-distended; normal bowel sounds; no masses, no organomegaly    Rectal:   Deferred    Extremities:   No cyanosis, clubbing or edema    Neuro:   Moves all 4 extremities    Skin:   No jaundice, rashes, or lesions      LABORATORY RESULTS     No visits with results within 1 Day(s) from this visit.   Latest known visit with results is:   Hospital Outpatient Visit on 11/22/2023   Component Date Value    POC Glucose 11/22/2023 160 (H)     Case Report 11/22/2023                      Value:Surgical Pathology Report                         Case: [id number]                                    Authorizing Provider:  Sudhir Gerard DO              Collected:           11/22/2023 1254              Ordering Location:     [facility] Received:            11/22/2023 1345                                     Operating Room                                                               Pathologist:           Lewis Hurst MD                                                             Specimens:   A) - Colon, cold fcp bx of microscopic colitis-random                                               B) - Rectum, cold snare of polyp x1                                                        Final Diagnosis 11/22/2023                      Value:This result contains rich text formatting which cannot be displayed here.    Additional Information 11/22/2023                      Value:This result contains rich text formatting which cannot be displayed here.    Synoptic Checklist 11/22/2023                      Value:                            COLON/RECTUM POLYP FORM - GI - All Specimens                                                                                     :    Adenoma(s)      Gross Description 11/22/2023                      Value:This result contains rich text formatting which cannot be displayed here.        IMAGING RESULTS     No results found.  I have personally reviewed any available and pertinent imaging study reports.      Sudhir Gerard D.O.  [facility]  Division of Gastroenterology & Hepatology  Available on [email or web address]  [email or web address]    ** Please Note: This note is constructed using a voice recognition dictation system. **

## 2024-02-12 NOTE — PATIENT INSTRUCTIONS
We will try rifaximin (Xifaxan) for IBS with diarrhea  Take this as directed  We will hold off on the CT scan and stool tests for now but if the antibiotic does not work, we may discuss doing further tests

## 2024-08-12 ENCOUNTER — OFFICE VISIT (OUTPATIENT)
Dept: GASTROENTEROLOGY | Facility: CLINIC | Age: 54
End: 2024-08-12
Payer: COMMERCIAL

## 2024-08-12 VITALS
BODY MASS INDEX: 27.93 KG/M2 | DIASTOLIC BLOOD PRESSURE: 90 MMHG | SYSTOLIC BLOOD PRESSURE: 170 MMHG | TEMPERATURE: 97.4 F | HEART RATE: 86 BPM | WEIGHT: 206.2 LBS | OXYGEN SATURATION: 97 % | HEIGHT: 72 IN

## 2024-08-12 DIAGNOSIS — Z86.010 HISTORY OF COLON POLYPS: ICD-10-CM

## 2024-08-12 DIAGNOSIS — K58.0 IRRITABLE BOWEL SYNDROME WITH DIARRHEA: Primary | ICD-10-CM

## 2024-08-12 DIAGNOSIS — Z80.0 FAMILY HISTORY OF COLON CANCER IN MOTHER: ICD-10-CM

## 2024-08-12 DIAGNOSIS — K80.20 GALLSTONES: ICD-10-CM

## 2024-08-12 PROBLEM — R19.7 DIARRHEA: Status: RESOLVED | Noted: 2023-10-12 | Resolved: 2024-08-12

## 2024-08-12 PROCEDURE — 99214 OFFICE O/P EST MOD 30 MIN: CPT | Performed by: STUDENT IN AN ORGANIZED HEALTH CARE EDUCATION/TRAINING PROGRAM

## 2024-08-12 NOTE — PROGRESS NOTES
[facility]  Outpatient Follow-up  Encounter: [id number]    PATIENT INFO     Name: Christopher Zuluaga  YOB: 1970   Age: 53 y.o.   Sex: male   MRN: [id number]    ASSESSMENT & PLAN     Christopher Zuluaga is a 53 y.o. male with history of IBS with diarrhea, diabetes, and known gallstones presents to GI office for follow-up.    Problem List Items Addressed This Visit       Irritable bowel syndrome with diarrhea - Primary     I suspect that his symptoms are secondary to IBS with diarrhea.  Suspect multiple factors contributing to his symptoms.  May be dietary related versus possibly SIBO versus underlying anxiety or stress.  Fortunately, his symptoms have continued to improve.  He had some response to the rifaximin.  He also tried cutting out dairy in his diet which improved his symptoms.  This may be indicative of some lactose intolerance.  I suspect that he has some degree of diabetic diarrhea.    Given his improvement with rifaximin although incomplete response, I would recommend repeat treatment course with rifaximin  He is agreeable  Start rifaximin 550 mg every 8 hours x 14 days  I have also recommended fiber supplementation to help provide stool bulk  He may use OTC Metamucil or Benefiber         Relevant Medications    rifaximin (XIFAXAN) 550 mg tablet    Gallstones     Gallstones noted on prior CT although low suspicion for acute cholecystitis or biliary colic.    Follow clinically  If symptoms worsen suggestive of gallstone disease, can consider follow-up with general surgery for possible cholecystectomy         Family history of colon cancer in mother     Family history of colon cancer in his mother.  No other family history of colon cancer.  Recent colonoscopy reviewed.  Increased risk for colon cancer given family history.    Recommend increased screening intervals every 5 years  Next due in 2028         History of colon polyps     No orders of the defined types were  placed in this encounter.      FOLLOW-UP: 6 months    HISTORY OF PRESENT ILLNESS       Christopher Zuluaga is a 53 y.o. male who presents to GI office for follow-up.  Fortunately, patient states that his symptoms have gradually improved.  Previously seen for complaints of abdominal discomfort and diarrhea.  He was treated with a course of rifaximin which he states has helped improve his symptoms.  He also reports that he believes that dietary factors may be contributing.  He tried cutting out dairy in his diet which did help his symptoms for several days.  He did note that eating processed food did seem to cause an exacerbation in his symptoms.  However, it has been difficult to follow a clean diet due to work schedule and costs.  Fortunately, overall he is feeling better.  Weight has remained stable.  No hematochezia or melena.    He underwent colonoscopy in November with single subcentimeter polyp removed, medium hemorrhoids, otherwise unremarkable colon.  Biopsies were negative.     ENDOSCOPIC HISTORY     UPPER ENDOSCOPY: None    COLONOSCOPY: Single subcentimeter polyp removed, medium internal hemorrhoids, otherwise unremarkable (biopsies negative); recommended repeat in 5 years based on family history    REVIEW OF SYSTEMS     CONSTITUTIONAL: Denies any fever, chills, rigors, and weight loss  HEENT: No earache or tinnitus, denies hearing loss or visual disturbances  CARDIOVASCULAR: No chest pain or palpitations  RESPIRATORY: Denies any cough, hemoptysis, shortness of breath or dyspnea on exertion  GASTROINTESTINAL: As noted in the History of Present Illness  GENITOURINARY: No problems with urination, denies any hematuria or dysuria  NEUROLOGIC: No dizziness or vertigo, denies headaches   MUSCULOSKELETAL: Denies any muscle or joint pain   SKIN: Denies jaundice or itching  PSYCHOSOCIAL: Denies depression or anxiety, denies any recent memory loss     Historical Information   Past Medical History:   Diagnosis Date     Depression     Diabetes mellitus (HCC)      History reviewed. No pertinent surgical history.  Social History   Social History     Substance and Sexual Activity   Alcohol Use Yes    Comment: occasionally     Social History     Substance and Sexual Activity   Drug Use Never     Social History     Tobacco Use   Smoking Status Former   Smokeless Tobacco Never   Tobacco Comments    quit 8 years ago        Stated he VAPED this morning 11/22/23 at 0930      Family History   Problem Relation Age of Onset    Colon cancer Mother     Heart disease Father        MEDICATIONS & ALLERGIES     Current Outpatient Medications   Medication Instructions    cholestyramine (QUESTRAN) 4 g, Oral, 3 times daily with meals    dicyclomine (BENTYL) 20 mg, Oral, Every 6 hours    gabapentin (NEURONTIN) 100 mg, Oral, Daily PRN    insulin glargine (LANTUS) 45 Units, Subcutaneous, Daily    insulin lispro (HumaLOG Mauricio KwikPen) 100 units/mL injection pen Take 1 unit insulin for every 10g carbohydrates in meals.    Insulin Pen Needle (BD Pen Needle Kelsey U/F) 32G X 4 MM MISC Use four times daily as directed with insulin pen    lisinopril (ZESTRIL) 10 mg, Oral, Daily    rifaximin (XIFAXAN) 550 mg, Oral, Every 8 hours scheduled    Semglee, yfgn, 100 UNIT/ML SOLN No dose, route, or frequency recorded.     Allergies   Allergen Reactions    Influenza Virus Vaccine Nausea Only     Made sick in past       PHYSICAL EXAM      Objective   Blood pressure 170/90, pulse 86, temperature (!) 97.4 °F (36.3 °C), temperature source Temporal, height 6' (1.829 m), weight 93.5 kg (206 lb 3.2 oz), SpO2 97%. Body mass index is 27.97 kg/m².    General Appearance:   Alert, cooperative, no distress   HEENT:   Normocephalic, atraumatic, anicteric     Neck:   Supple, symmetrical, trachea midline   Lungs:   Equal chest rise, respirations unlabored    Heart:   Regular rate   Abdomen:   Soft, non-tender, non-distended; normal bowel sounds; no masses, no organomegaly    Rectal:    Deferred    Extremities:   No cyanosis or edema    Neuro:   Moves all 4 extremities    Skin:   No jaundice, rashes, or lesions      LABORATORY RESULTS     No visits with results within 1 Day(s) from this visit.   Latest known visit with results is:   Hospital Outpatient Visit on 11/22/2023   Component Date Value    POC Glucose 11/22/2023 160 (H)     Case Report 11/22/2023                      Value:Surgical Pathology Report                         Case: [id number]                                   Authorizing Provider:  Sudhir Gerard DO              Collected:           11/22/2023 1254              Ordering Location:     [facility] Received:            11/22/2023 1345                                     Operating Room                                                               Pathologist:           Lewis Hurst MD                                                             Specimens:   A) - Colon, cold fcp bx of microscopic colitis-random                                               B) - Rectum, cold snare of polyp x1                                                        Final Diagnosis 11/22/2023                      Value:A. Colon, cold fcp bx of microscopic colitis-random:                          - Unremarkable colonic mucosa                          - No evidence of acute and chronic colitis seen                          - No microscopic colitis seen                                                     B. Rectum, cold snare of polyp x1:                          - Fragments of tubular adenoma, with no high grade dysplasia or carcinoma                                                         Additional Information 11/22/2023                      Value:All reported additional testing was performed with appropriately reactive                           controls.  These tests were developed and their performance                           characteristics determined by [facility] or  "                          appropriate performing facility, though some tests may be performed on                           tissues which have not been validated for performance characteristics                           (such as staining performed on alcohol exposed cell blocks and decalcified                           tissues).  Results should be interpreted with caution and in the context                           of the patients’ clinical condition. These tests may not be cleared or                           approved by the U.S. Food and Drug Administration, though the FDA has                           determined that such clearance or approval is not necessary. These tests                           are used for clinical purposes and they should not be regarded as                           investigational or for research. This laboratory has been approved by [place]                           88, designated as a high-complexity laboratory and is qualified to perform                           these tests.                                                    Interpretation performed at [facility], [address] [address]                                                     .    Synoptic Checklist 11/22/2023                      Value:                            COLON/RECTUM POLYP FORM - GI - All Specimens                                                                                     :    Adenoma(s)      Gross Description 11/22/2023                      Value:A. The specimen is received in formalin, labeled with the patient's name                           and hospital number, and is designated \" colon biopsy of microscopic                           colitis-random\".  The specimen consists of multiple tan irregularly shaped                           tissue fragments measuring in aggregate of 1.0 x 0.6 x 0.2 cm.  Entirely                           submitted.  One screened " "cassette.                                                    B. The specimen is received in formalin, labeled with the patient's name                           and hospital number, and is designated \" rectum polyp x 1\".  The specimen                           consists of multiple tan-brown irregularly shaped soft tissue fragments                           versus food particles measuring in aggregate of 1.2 x 0.4 x 0.2 cm.  The                           specimen is entirely submitted in a screened cassette.                                                    Note: The estimated total formalin fixation time based upon information                           provided by the submitting clinician and the standard processing schedule                           is under 72 hours. Ashely Urena        IMAGING RESULTS     No results found.  I have personally reviewed any available and pertinent imaging study reports.      Sudhir Gerard D.O.  [facility]  Division of Gastroenterology & Hepatology  Available on TigerText  [email or web address]    ** Please Note: This note is constructed using a voice recognition dictation system. **  "

## 2024-08-12 NOTE — PATIENT INSTRUCTIONS
Please supplement fiber in your diet  You can use over-the-counter Metamucil or Benefiber  Goal fiber intake 25-35 grams  We will repeat a course of rifaximin   Every 8 hours for 14 days

## 2024-08-12 NOTE — ASSESSMENT & PLAN NOTE
Family history of colon cancer in his mother.  No other family history of colon cancer.  Recent colonoscopy reviewed.  Increased risk for colon cancer given family history.    Recommend increased screening intervals every 5 years  Next due in 2028

## 2024-08-12 NOTE — ASSESSMENT & PLAN NOTE
Gallstones noted on prior CT although low suspicion for acute cholecystitis or biliary colic.    Follow clinically  If symptoms worsen suggestive of gallstone disease, can consider follow-up with general surgery for possible cholecystectomy

## 2024-08-12 NOTE — ASSESSMENT & PLAN NOTE
I suspect that his symptoms are secondary to IBS with diarrhea.  Suspect multiple factors contributing to his symptoms.  May be dietary related versus possibly SIBO versus underlying anxiety or stress.  Fortunately, his symptoms have continued to improve.  He had some response to the rifaximin.  He also tried cutting out dairy in his diet which improved his symptoms.  This may be indicative of some lactose intolerance.  I suspect that he has some degree of diabetic diarrhea.    Given his improvement with rifaximin although incomplete response, I would recommend repeat treatment course with rifaximin  He is agreeable  Start rifaximin 550 mg every 8 hours x 14 days  I have also recommended fiber supplementation to help provide stool bulk  He may use OTC Metamucil or Benefiber

## 2025-01-02 ENCOUNTER — OFFICE VISIT (OUTPATIENT)
Dept: GASTROENTEROLOGY | Facility: CLINIC | Age: 55
End: 2025-01-02
Payer: COMMERCIAL

## 2025-01-02 VITALS
SYSTOLIC BLOOD PRESSURE: 142 MMHG | BODY MASS INDEX: 27.77 KG/M2 | WEIGHT: 205 LBS | DIASTOLIC BLOOD PRESSURE: 85 MMHG | HEART RATE: 93 BPM | OXYGEN SATURATION: 96 % | HEIGHT: 72 IN | TEMPERATURE: 98.4 F

## 2025-01-02 DIAGNOSIS — Z80.0 FAMILY HISTORY OF COLON CANCER IN MOTHER: ICD-10-CM

## 2025-01-02 DIAGNOSIS — K80.20 GALLSTONES: ICD-10-CM

## 2025-01-02 DIAGNOSIS — Z86.0100 HISTORY OF COLON POLYPS: ICD-10-CM

## 2025-01-02 DIAGNOSIS — K58.0 IRRITABLE BOWEL SYNDROME WITH DIARRHEA: Primary | ICD-10-CM

## 2025-01-02 PROCEDURE — 99214 OFFICE O/P EST MOD 30 MIN: CPT | Performed by: STUDENT IN AN ORGANIZED HEALTH CARE EDUCATION/TRAINING PROGRAM

## 2025-01-03 NOTE — ASSESSMENT & PLAN NOTE
Likely has multiple factors contributing to his symptoms including dietary factors and work stress.  Fortunately, symptoms are improved.  Recent colonoscopy reviewed with negative biopsies.  Status post treatment course with rifaximin which did help somewhat.    Recommended that he continue with dietary changes in effort to better manage his symptoms at this appears to provide the most relief  May continue with fiber supplementation to provide stool bulk

## 2025-01-03 NOTE — ASSESSMENT & PLAN NOTE
Noted gallstones.  Does have intermittent discomfort which she attributes possibly to gallstones.    Discussed referral to general surgery to discuss cholecystectomy  Patient would like to hold off and make dietary changes at this time  Will reevaluate at time of office follow-up for possible referral to surgery

## 2025-01-03 NOTE — ASSESSMENT & PLAN NOTE
Family history of colon cancer in his mother.  Recent colonoscopy on 11/2023 with single subcentimeter polyp removed.  Recommend shortened screening intervals given family history.    Recommend repeat colonoscopy in 5 years (due in 2028)

## 2025-01-03 NOTE — PROGRESS NOTES
Name: Christopher Zuluaga      : 1970      MRN: [id number]  Encounter Provider: Sudhir Gerard DO  Encounter Date: 2025   Encounter department: [facility] [place]  :  Assessment & Plan  Irritable bowel syndrome with diarrhea  Likely has multiple factors contributing to his symptoms including dietary factors and work stress.  Fortunately, symptoms are improved.  Recent colonoscopy reviewed with negative biopsies.  Status post treatment course with rifaximin which did help somewhat.    Recommended that he continue with dietary changes in effort to better manage his symptoms at this appears to provide the most relief  May continue with fiber supplementation to provide stool bulk         Family history of colon cancer in mother  Family history of colon cancer in his mother.  Recent colonoscopy on 2023 with single subcentimeter polyp removed.  Recommend shortened screening intervals given family history.    Recommend repeat colonoscopy in 5 years (due in )         History of colon polyps  Single subcentimeter polyp removed on colonoscopy in .    Next colonoscopy due in          Gallstones  Noted gallstones.  Does have intermittent discomfort which she attributes possibly to gallstones.    Discussed referral to general surgery to discuss cholecystectomy  Patient would like to hold off and make dietary changes at this time  Will reevaluate at time of office follow-up for possible referral to surgery           History of Present Illness   54-year-old male presents to GI office for follow-up of IBS, family history of colon cancer, and gallstones.      Christopher Zuluaga is a 54 y.o. male who presents to GI office for follow-up.  He has been doing better since his last office visit.  Previously seen for IBS with diarrhea.  Had treatment course of rifaximin x 2.  He did have some improvement with this but not entirely.  Fortunately, he has been making dietary changes which has been  helping more.  He has been trying to cut out processed food and eating more organic food.  He does occasionally have to eat processed food due to his work schedule.  This does seem to exacerbate symptoms.  Fortunately, appetite remains good and his weight remains stable.  Recent colonoscopy reviewed with the patient.  Single subcentimeter polyp removed.  Medium internal hemorrhoids.  Biopsies were negative.  Denies any alarming lower GI symptoms at this time.  Offers no other acute GI complaints today.    History obtained from: patient    Review of Systems   Constitutional:  Negative for activity change and appetite change.   HENT:  Negative for trouble swallowing.    Respiratory:  Negative for shortness of breath.    Cardiovascular:  Negative for chest pain.   Gastrointestinal:  Positive for diarrhea. Negative for abdominal distention, abdominal pain, blood in stool, constipation, nausea, rectal pain and vomiting.   Skin:  Negative for color change, rash and wound.     Medical History Reviewed by provider this encounter:     .  Current Outpatient Medications on File Prior to Visit   Medication Sig Dispense Refill    dicyclomine (BENTYL) 20 mg tablet Take 1 tablet (20 mg total) by mouth every 6 (six) hours 60 tablet 3    gabapentin (NEURONTIN) 100 mg capsule Take 1 capsule (100 mg total) by mouth daily as needed (pain) 90 capsule 2    insulin glargine (LANTUS) 100 units/mL subcutaneous injection Inject 45 Units under the skin daily 40.5 mL 2    insulin lispro (HumaLOG Mauricio KwikPen) 100 units/mL injection pen Take 1 unit insulin for every 10g carbohydrates in meals. 45 mL 3    Insulin Pen Needle (BD Pen Needle Kelsey U/F) 32G X 4 MM MISC Use four times daily as directed with insulin pen 400 each 3    lisinopril (ZESTRIL) 10 mg tablet Take 1 tablet (10 mg total) by mouth daily 90 tablet 1    Semglee, yfgn, 100 UNIT/ML SOLN  (Patient not taking: Reported on 2/12/2024)       No current facility-administered medications  on file prior to visit.      Social History     Tobacco Use    Smoking status: Former    Smokeless tobacco: Never    Tobacco comments:     quit 8 years ago          Stated he VAPED this morning 11/22/23 at 0930    Vaping Use    Vaping status: Every Day    Substances: Flavoring   Substance and Sexual Activity    Alcohol use: Yes     Comment: occasionally    Drug use: Never    Sexual activity: Not on file        Objective   /85 (BP Location: Left arm, Patient Position: Sitting, Cuff Size: Adult)   Pulse 93   Temp 98.4 °F (36.9 °C) (Temporal)   Ht 6' (1.829 m)   Wt 93 kg (205 lb)   SpO2 96%   BMI 27.80 kg/m²      Physical Exam  Vitals reviewed.   Constitutional:       Appearance: Normal appearance.   HENT:      Head: Normocephalic and atraumatic.      Nose: Nose normal.   Eyes:      Extraocular Movements: Extraocular movements intact.   Cardiovascular:      Rate and Rhythm: Normal rate and regular rhythm.   Pulmonary:      Effort: Pulmonary effort is normal.      Breath sounds: Normal breath sounds.   Abdominal:      General: Bowel sounds are normal. There is no distension.      Palpations: Abdomen is soft. There is no mass.      Tenderness: There is no abdominal tenderness. There is no guarding or rebound.   Neurological:      Mental Status: He is alert. Mental status is at baseline.   Psychiatric:         Mood and Affect: Mood normal.         Behavior: Behavior normal.         Administrative Statements   I have spent a total time of 15 minutes in caring for this patient on the day of the visit/encounter including Diagnostic results, Prognosis, Risks and benefits of tx options, Instructions for management, Patient and family education, Importance of tx compliance, Risk factor reductions, Impressions, Documenting in the medical record, Reviewing / ordering tests, medicine, procedures  , and Obtaining or reviewing history  . Topics discussed with the patient / family include symptom assessment and  management, medication review, and medication adjustment.

## 2025-02-02 DIAGNOSIS — E11.9 TYPE 2 DIABETES MELLITUS WITHOUT COMPLICATION, WITH LONG-TERM CURRENT USE OF INSULIN (HCC): ICD-10-CM

## 2025-02-02 DIAGNOSIS — Z79.4 TYPE 2 DIABETES MELLITUS WITHOUT COMPLICATION, WITH LONG-TERM CURRENT USE OF INSULIN (HCC): ICD-10-CM

## 2025-02-03 RX ORDER — INSULIN LISPRO 100 [IU]/ML
INJECTION, SOLUTION SUBCUTANEOUS
Qty: 60 ML | Refills: 3 | Status: SHIPPED | OUTPATIENT
Start: 2025-02-03

## 2025-02-21 ENCOUNTER — TELEPHONE (OUTPATIENT)
Dept: FAMILY MEDICINE CLINIC | Facility: CLINIC | Age: 55
End: 2025-02-21

## 2025-06-20 ENCOUNTER — OFFICE VISIT (OUTPATIENT)
Dept: FAMILY MEDICINE CLINIC | Facility: CLINIC | Age: 55
End: 2025-06-20
Payer: COMMERCIAL

## 2025-06-20 VITALS
DIASTOLIC BLOOD PRESSURE: 90 MMHG | WEIGHT: 218 LBS | HEART RATE: 115 BPM | OXYGEN SATURATION: 96 % | SYSTOLIC BLOOD PRESSURE: 160 MMHG | HEIGHT: 72 IN | TEMPERATURE: 96.8 F | BODY MASS INDEX: 29.53 KG/M2 | RESPIRATION RATE: 16 BRPM

## 2025-06-20 DIAGNOSIS — Z79.4 TYPE 2 DIABETES MELLITUS WITHOUT COMPLICATION, WITH LONG-TERM CURRENT USE OF INSULIN (HCC): Primary | ICD-10-CM

## 2025-06-20 DIAGNOSIS — E11.9 TYPE 2 DIABETES MELLITUS WITHOUT COMPLICATION, WITH LONG-TERM CURRENT USE OF INSULIN (HCC): Primary | ICD-10-CM

## 2025-06-20 DIAGNOSIS — F43.29 PROLONGED GRIEF REACTION: ICD-10-CM

## 2025-06-20 DIAGNOSIS — L97.221 VENOUS STASIS ULCER OF LEFT CALF LIMITED TO BREAKDOWN OF SKIN WITHOUT VARICOSE VEINS (HCC): ICD-10-CM

## 2025-06-20 DIAGNOSIS — I10 HTN, GOAL BELOW 140/80: ICD-10-CM

## 2025-06-20 DIAGNOSIS — I87.2 VENOUS STASIS ULCER OF LEFT CALF LIMITED TO BREAKDOWN OF SKIN WITHOUT VARICOSE VEINS (HCC): ICD-10-CM

## 2025-06-20 DIAGNOSIS — F33.1 MODERATE EPISODE OF RECURRENT MAJOR DEPRESSIVE DISORDER (HCC): ICD-10-CM

## 2025-06-20 LAB — SL AMB POCT HEMOGLOBIN AIC: 10.5 (ref ?–6.5)

## 2025-06-20 PROCEDURE — 99215 OFFICE O/P EST HI 40 MIN: CPT | Performed by: PHYSICIAN ASSISTANT

## 2025-06-20 PROCEDURE — 83036 HEMOGLOBIN GLYCOSYLATED A1C: CPT | Performed by: PHYSICIAN ASSISTANT

## 2025-06-20 RX ORDER — SILVER SULFADIAZINE 10 MG/G
CREAM TOPICAL DAILY
Qty: 50 G | Refills: 2 | Status: SHIPPED | OUTPATIENT
Start: 2025-06-20

## 2025-06-20 RX ORDER — FUROSEMIDE 20 MG/1
20 TABLET ORAL DAILY
Qty: 90 TABLET | Refills: 0 | Status: SHIPPED | OUTPATIENT
Start: 2025-06-20

## 2025-06-20 RX ORDER — LISINOPRIL 10 MG/1
10 TABLET ORAL DAILY
Qty: 90 TABLET | Refills: 1 | Status: SHIPPED | OUTPATIENT
Start: 2025-06-20 | End: 2025-09-18

## 2025-06-20 NOTE — ASSESSMENT & PLAN NOTE
Had history of previous and responded well to prozac and wellbutrin  Would like to do counceling through [employer]

## 2025-06-20 NOTE — PROGRESS NOTES
Name: Christopher Zuluaga      : 1970      MRN: [id number]  Encounter Provider: Kamila Salazar PA-C  Encounter Date: 2025   [facility]  :  Assessment & Plan  Type 2 diabetes mellitus without complication, with long-term current use of insulin (HCC)  A1C not at goal. He has started implementing dietary modificationg. He is only using prandial insulin    Lab Results   Component Value Date    HGBA1C 10.5 (A) 2025       Orders:    POCT hemoglobin A1c    lisinopril (ZESTRIL) 10 mg tablet; Take 1 tablet (10 mg total) by mouth daily    Continous glucose monitoring dexcom placement; Future    Continous glucose monitoring dexcom intrepretation; Future    Venous stasis ulcer of left calf limited to breakdown of skin without varicose veins (HCC)  Start lasix once daily.  No S/S of infection at ulcer  Use cream once daily  Compression at night  Focus on BS control   Orders:    furosemide (LASIX) 20 mg tablet; Take 1 tablet (20 mg total) by mouth daily    silver sulfadiazine (SILVADENE,SSD) 1 % cream; Apply topically daily    HTN, goal below 140/80  Elevated today  Out of medication, will resend        Moderate episode of recurrent major depressive disorder (HCC)  Had history of previous and responded well to prozac and wellbutrin  Would like to do counceling through [employer]        Prolonged grief reaction  Secondary to losing dog about 1 year ago               History of Present Illness   53 y/o male PMH type 2 DM, HTN, IBS, family history of colon cancer, gallstones presents for follow up. He has not been seen in quite some time. He states 1 year ago he lost his dog and has let his health suffer since.     BP elevated. Has not had medication in seveal months.    He is using only meal time insulin. Recently started eating better, met with nutritionist. He would like a dexcom and he does not like pricking his finger 4x daily.     He has a wound on his  left calf for about 4 week. It does not hurt or drain fluid.     Diabetes  He has type 2 diabetes mellitus. No MedicAlert identification noted. The initial diagnosis of diabetes was made 20 years ago. Hypoglycemia symptoms include sleepiness. Pertinent negatives for hypoglycemia include no confusion, dizziness, headaches, hunger, mood changes, nervousness/anxiousness, pallor, seizures, speech difficulty, sweats or tremors. Associated symptoms include fatigue, foot paresthesias and weakness. Pertinent negatives for diabetes include no blurred vision, no chest pain, no foot ulcerations, no polydipsia, no polyphagia, no polyuria, no visual change and no weight loss. Pertinent negatives for hypoglycemia complications include no blackouts, no hospitalization, no nocturnal hypoglycemia, no required assistance and no required glucagon injection. Symptoms are worsening. Diabetic complications include impotence, nephropathy and peripheral neuropathy. Pertinent negatives for diabetic complications include no CVA, heart disease, PVD or retinopathy. There are no known risk factors for coronary artery disease. Current diabetic treatment includes insulin injections. He is compliant with treatment some of the time. He is currently taking insulin pre-lunch. Insulin injections are given by patient. Rotation sites for injection include the abdominal wall. His weight is increasing steadily. He is following a generally unhealthy diet. When asked about meal planning, he reported none. He has not had a previous visit with a dietitian. He participates in exercise weekly. He monitors blood glucose at home 1-2 x per day. He monitors urine at home <1 x per month. Blood glucose monitoring compliance is inadequate. There is no change in his home blood glucose trend. His breakfast blood glucose is taken between 8-9 am. His breakfast blood glucose range is generally >200 mg/dl. His lunch blood glucose is taken between 1-2 pm. His lunch blood  glucose range is generally >200 mg/dl. His dinner blood glucose is taken between 6-7 pm. His dinner blood glucose range is generally >200 mg/dl. His bedtime blood glucose is taken after 11 pm. His bedtime blood glucose range is generally >200 mg/dl. His overall blood glucose range is >200 mg/dl. He sees a podiatrist.Eye exam is not current.     Review of Systems   Constitutional:  Positive for fatigue. Negative for weight loss.   Eyes:  Negative for blurred vision.   Cardiovascular:  Negative for chest pain.   Endocrine: Negative for polydipsia, polyphagia and polyuria.   Genitourinary:  Positive for impotence.   Skin:  Positive for wound. Negative for pallor.   Neurological:  Positive for weakness. Negative for dizziness, tremors, seizures, speech difficulty and headaches.   Psychiatric/Behavioral:  Negative for confusion. The patient is not nervous/anxious.        Objective   /90 (BP Location: Left arm, Patient Position: Sitting, Cuff Size: Standard)   Pulse (!) 115   Temp (!) 96.8 °F (36 °C) (Tympanic)   Resp 16   Ht 6' (1.829 m)   Wt 98.9 kg (218 lb)   SpO2 96%   BMI 29.57 kg/m²      Physical Exam  Vitals and nursing note reviewed.   Constitutional:       General: He is not in acute distress.     Appearance: He is well-developed.   HENT:      Head: Normocephalic and atraumatic.     Eyes:      Conjunctiva/sclera: Conjunctivae normal.       Cardiovascular:      Rate and Rhythm: Normal rate and regular rhythm.      Heart sounds: No murmur heard.  Pulmonary:      Effort: Pulmonary effort is normal. No respiratory distress.      Breath sounds: Normal breath sounds.   Abdominal:      Palpations: Abdomen is soft.      Tenderness: There is no abdominal tenderness.     Musculoskeletal:         General: No swelling.      Cervical back: Neck supple.      Right lower leg: Edema present.      Left lower leg: Edema present.        Legs:       Comments: Venous stasis dermatitis bila     Skin:     General: Skin is  warm and dry.      Capillary Refill: Capillary refill takes less than 2 seconds.     Neurological:      Mental Status: He is alert.     Psychiatric:         Mood and Affect: Mood normal.       Administrative Statements   I have spent a total time of 48 minutes in caring for this patient on the day of the visit/encounter including Diagnostic results, Prognosis, Risks and benefits of tx options, Instructions for management, Patient and family education, Importance of tx compliance, Risk factor reductions, Impressions, Counseling / Coordination of care, Documenting in the medical record, Reviewing/placing orders in the medical record (including tests, medications, and/or procedures), and Obtaining or reviewing history  .

## 2025-07-24 ENCOUNTER — OFFICE VISIT (OUTPATIENT)
Dept: GASTROENTEROLOGY | Facility: CLINIC | Age: 55
End: 2025-07-24
Payer: COMMERCIAL

## 2025-07-24 VITALS
TEMPERATURE: 98.1 F | HEIGHT: 72 IN | OXYGEN SATURATION: 97 % | HEART RATE: 100 BPM | WEIGHT: 211 LBS | SYSTOLIC BLOOD PRESSURE: 125 MMHG | DIASTOLIC BLOOD PRESSURE: 83 MMHG | BODY MASS INDEX: 28.58 KG/M2

## 2025-07-24 DIAGNOSIS — Z86.0100 HISTORY OF COLON POLYPS: ICD-10-CM

## 2025-07-24 DIAGNOSIS — K80.20 GALLSTONES: Primary | ICD-10-CM

## 2025-07-24 DIAGNOSIS — K58.0 IRRITABLE BOWEL SYNDROME WITH DIARRHEA: ICD-10-CM

## 2025-07-24 DIAGNOSIS — Z80.0 FAMILY HISTORY OF COLON CANCER IN MOTHER: ICD-10-CM

## 2025-07-24 PROCEDURE — 99213 OFFICE O/P EST LOW 20 MIN: CPT | Performed by: STUDENT IN AN ORGANIZED HEALTH CARE EDUCATION/TRAINING PROGRAM

## 2025-07-24 NOTE — ASSESSMENT & PLAN NOTE
Likely multiple factors contributing to his symptoms including dietary factors, stress, and diabetes.  Treated with rifaximin empirically with some improvement in symptoms although he does continue to experience some.  Fortunately, his symptoms are relatively improved.    Encouraged him to continue with following strict diet to minimize trigger foods  Offered Bentyl or Levsin for symptom relief if needed but he would like to hold off on starting any additional medications

## 2025-07-24 NOTE — PROGRESS NOTES
Name: Christopher Zuluaga      : 1970      MRN: [id number]  Encounter Provider: Sudhir Gerard DO  Encounter Date: 2025   Encounter department: [facility] [place]    :  Assessment & Plan  Gallstones  Gallstones noted on prior imaging.  Intermittent discomfort which I do suspect may be related to gallstones.    Previously referred to general surgery  Patient would like to hold off on surgery at this time  I encouraged him to continue with dietary changes to help minimize his symptoms  Advised patient to contact us if his symptoms were to worsen so that we can schedule cholecystectomy as he is already established care with general surgery         Irritable bowel syndrome with diarrhea  Likely multiple factors contributing to his symptoms including dietary factors, stress, and diabetes.  Treated with rifaximin empirically with some improvement in symptoms although he does continue to experience some.  Fortunately, his symptoms are relatively improved.    Encouraged him to continue with following strict diet to minimize trigger foods  Offered Bentyl or Levsin for symptom relief if needed but he would like to hold off on starting any additional medications         Family history of colon cancer in mother  History of colon polyps  Recent colonoscopy in 2023 with single subcentimeter polyp removed.  Family history of colon cancer in mother.  Recommend shortened screening intervals given family history.    Next colonoscopy due in            History of Present Illness  The patient is a 54-year-old male who presents to the GI office for follow-up. He was previously seen for symptoms related to irritable bowel syndrome with diarrhea. There is a family history of colon cancer as well as a personal history of colon polyps and gallstones. He has been doing relatively well from a GI standpoint since his last office visit. He has made some significant changes to his diet, which have helped  with some of the discomfort he attributed to gallstones. His primary concern at this point is hypertension, which appears to be controlled today, but also lower extremity wounds, which may be due to diabetic ulcer. His focus has been on this at this time. His previous complaints of abdominal discomfort and gassiness have improved. He states that he took fiber as well, which did not seem to help with anything, but it seems that the dietary changes have been the most beneficial. He does not report any other alarming GI symptoms.    FAMILY HISTORY  There is a family history of colon cancer.    History obtained from: patient    Review of Systems   Constitutional:  Negative for activity change, appetite change and unexpected weight change.   HENT:  Negative for trouble swallowing.    Respiratory:  Negative for shortness of breath.    Cardiovascular:  Negative for chest pain.   Gastrointestinal:  Positive for abdominal pain (Generalized, improved) and diarrhea (Improved). Negative for abdominal distention, blood in stool, constipation, nausea, rectal pain and vomiting.   Skin:  Positive for color change and wound (Lower extremities). Negative for rash.     Medical History Reviewed by provider this encounter:     .  Medications Ordered Prior to Encounter[1]   Social History[2]     Objective   /83 (BP Location: Left arm, Patient Position: Sitting, Cuff Size: Adult)   Pulse 100   Temp 98.1 °F (36.7 °C) (Temporal)   Ht 6' (1.829 m)   Wt 95.7 kg (211 lb)   SpO2 97%   BMI 28.62 kg/m²      Physical Exam  Patient appears appropriate for age, no apparent distress.  Lung sounds are clear bilaterally. No respiratory distress.  Heart rate is normal. Rhythm is regular.  Abdomen is soft, nontender, nondistended with audible bowel sounds.  Lower extremities show erythema and a small wound on the left calf that is covered in a bandage, small superficial wound on the anterior aspect of the left lower extremity as well.        Results      Results for orders placed during the hospital encounter of 11/22/23    Colonoscopy    Narrative  Table formatting from the original result was not included.  [facility] Operating Room  [address] [address]  [phone]      DATE OF SERVICE:  11/22/23    PHYSICIAN(S):  Attending:  Sudhir Gerard DO    Fellow:  No Staff Documented      INDICATION:  Family history of colon cancer, Diarrhea, unspecified type      POST-OP DIAGNOSIS:  See the impression below.    HISTORY:  Prior colonoscopy: No prior colonoscopy.    BOWEL PREPARATION:        PREPROCEDURE:  Informed consent was obtained for the procedure, including sedation. Risks including but not limited to bleeding, infection, perforation, adverse drug reaction and aspiration were explained in detail. Also explained about less than 100% sensitivity with the exam and other alternatives. The patient was placed in the left lateral decubitus position.    Procedure: Colonoscopy    DETAILS OF PROCEDURE:  Patient was taken to the procedure room where a time out was performed to confirm correct patient and correct procedure. The patient underwent monitored anesthesia care, which was administered by an anesthesia professional. The patient's blood pressure, heart rate, level of consciousness, oxygen, respirations, ECG and ETCO2 were monitored throughout the procedure. A digital rectal exam was performed. A perianal exam was performed. The scope was introduced through the anus and advanced to the cecum. Retroflexion was performed in the rectum. The quality of bowel preparation was evaluated using the Coden Bowel Preparation Scale with scores of: right colon = 2, transverse colon = 2, left colon = 2. The total BBPS score was 6. Bowel prep was adequate. The patient experienced no blood loss. The procedure was not difficult. The patient tolerated the procedure well. There were no apparent adverse events.      ANESTHESIA INFORMATION:  ASA:  II  Anesthesia Type: IV Sedation with Anesthesia    MEDICATIONS:  No administrations occurring from 1231 to 1305 on 11/22/23        FINDINGS:  One sessile, adenomatous-appearing Jud Is polyp measuring smaller than 5 mm in the rectosigmoid; completely removed en bloc by cold snare and retrieved specimen  Internal medium hemorrhoids  Performed forceps biopsies in the ascending colon, transverse colon and descending colon  The ileocecal valve, cecum, ascending colon, hepatic flexure, transverse colon, splenic flexure, descending colon and sigmoid colon appeared normal.      EVENTS:  Procedure Events  Event Event Time  ENDO CECUM REACHED 11/22/2023 12:49 PM  ENDO SCOPE OUT TIME 11/22/2023  1:05 PM      SPECIMENS:  ID Type Source Tests Collected by Time Destination  1 : cold fcp bx of microscopic colitis-random Tissue Colon TISSUE EXAM Sudhir Gerard DO 11/22/2023 12:54 PM  2 : cold snare of polyp x1 Tissue Rectum TISSUE EXAM Sudhir Gerard DO 11/22/2023  1:02 PM      EQUIPMENT:  Colonoscope -  ENDOCUFF VISION LRG GREEN ID [id number]        Impression  One Jud Is polyp measuring smaller than 5 mm in the rectosigmoid; removed by cold snare  Medium hemorrhoids  Performed forceps biopsies in the ascending colon, transverse colon and descending colon  The ileocecal valve, cecum, ascending colon, hepatic flexure, transverse colon, splenic flexure, descending colon and sigmoid colon appeared normal.        RECOMMENDATION:  Await pathology results  Repeat colonoscopy in 5 years  Personal history of colon polyps  Family history of colon cancer    Addendum: Recall recommendations updated for pathology results and family history of colon cancer in mother.            Sudhir Gerard DO      Administrative Statements   I have spent a total time of 15 minutes in caring for this patient on the day of the visit/encounter including Diagnostic results, Prognosis, Risks and benefits of tx options, Instructions for management, Patient and family  education, Importance of tx compliance, Risk factor reductions, Impressions, Counseling / Coordination of care, Documenting in the medical record, Reviewing/placing orders in the medical record (including tests, medications, and/or procedures), and Obtaining or reviewing history  .      Sudhir Gerard D.O.  [facility]  Division of Gastroenterology & Hepatology  Available on [email or web address]  [email or web address]    ** Please Note: This note is constructed using artificial intelligence and a voice recognition dictation system. **         [1]   Current Outpatient Medications on File Prior to Visit   Medication Sig Dispense Refill    furosemide (LASIX) 20 mg tablet Take 1 tablet (20 mg total) by mouth daily 90 tablet 0    gabapentin (NEURONTIN) 100 mg capsule Take 1 capsule (100 mg total) by mouth daily as needed (pain) (Patient taking differently: Take 100 mg by mouth daily as needed (pain) Taking PRN) 90 capsule 2    insulin glargine (LANTUS) 100 units/mL subcutaneous injection Inject 45 Units under the skin daily 40.5 mL 2    insulin lispro (HumaLOG Mauricio KwikPen) 100 units/mL injection pen INJECT 1 UNIT INSULIN FOR EVERY 10 GRAM CARBOHYDRATES IN MEALS 60 mL 3    Insulin Pen Needle (BD Pen Needle Kelsey U/F) 32G X 4 MM MISC Use four times daily as directed with insulin pen 400 each 3    lisinopril (ZESTRIL) 10 mg tablet Take 1 tablet (10 mg total) by mouth daily 90 tablet 1    Semglee, yfgn, 100 UNIT/ML SOLN  (Patient not taking: Reported on 2/12/2024)      silver sulfadiazine (SILVADENE,SSD) 1 % cream Apply topically daily 50 g 2    [DISCONTINUED] dicyclomine (BENTYL) 20 mg tablet Take 1 tablet (20 mg total) by mouth every 6 (six) hours (Patient not taking: Reported on 6/20/2025) 60 tablet 3     No current facility-administered medications on file prior to visit.   [2]   Social History  Tobacco Use    Smoking status: Former    Smokeless tobacco: Never    Tobacco comments:     quit 8 years ago           Stated he VAPED this morning 11/22/23 at 0930    Vaping Use    Vaping status: Every Day    Substances: Flavoring   Substance and Sexual Activity    Alcohol use: Yes     Comment: occasionally    Drug use: Never

## 2025-07-24 NOTE — ASSESSMENT & PLAN NOTE
Recent colonoscopy in 11/2023 with single subcentimeter polyp removed.  Family history of colon cancer in mother.  Recommend shortened screening intervals given family history.    Next colonoscopy due in 2028

## 2025-07-24 NOTE — ASSESSMENT & PLAN NOTE
Gallstones noted on prior imaging.  Intermittent discomfort which I do suspect may be related to gallstones.    Previously referred to general surgery  Patient would like to hold off on surgery at this time  I encouraged him to continue with dietary changes to help minimize his symptoms  Advised patient to contact us if his symptoms were to worsen so that we can schedule cholecystectomy as he is already established care with general surgery

## 2025-07-25 ENCOUNTER — OFFICE VISIT (OUTPATIENT)
Dept: FAMILY MEDICINE CLINIC | Facility: CLINIC | Age: 55
End: 2025-07-25
Payer: COMMERCIAL

## 2025-07-25 VITALS
SYSTOLIC BLOOD PRESSURE: 126 MMHG | BODY MASS INDEX: 28.58 KG/M2 | OXYGEN SATURATION: 97 % | WEIGHT: 211 LBS | HEIGHT: 72 IN | HEART RATE: 91 BPM | DIASTOLIC BLOOD PRESSURE: 78 MMHG

## 2025-07-25 DIAGNOSIS — I87.2 VENOUS STASIS ULCER OF LEFT CALF LIMITED TO BREAKDOWN OF SKIN WITHOUT VARICOSE VEINS (HCC): ICD-10-CM

## 2025-07-25 DIAGNOSIS — I10 HTN, GOAL BELOW 140/80: ICD-10-CM

## 2025-07-25 DIAGNOSIS — E11.9 TYPE 2 DIABETES MELLITUS WITH HEMOGLOBIN A1C GOAL OF LESS THAN 7.0% (HCC): Primary | ICD-10-CM

## 2025-07-25 DIAGNOSIS — L97.221 VENOUS STASIS ULCER OF LEFT CALF LIMITED TO BREAKDOWN OF SKIN WITHOUT VARICOSE VEINS (HCC): ICD-10-CM

## 2025-07-25 PROCEDURE — 99214 OFFICE O/P EST MOD 30 MIN: CPT | Performed by: PHYSICIAN ASSISTANT

## 2025-07-25 NOTE — PROGRESS NOTES
Name: Christopher Zuluaga      : 1970      MRN: [id number]  Encounter Provider: Kamila Salazar PA-C  Encounter Date: 2025   Encounter department: [facility]  :  Assessment & Plan  Type 2 diabetes mellitus with hemoglobin A1c goal of less than 7.0% (HCC)  Cont current. Discussed diet in detail   Recheck 2 months     Lab Results   Component Value Date    HGBA1C 10.5 (A) 2025       Orders:    Albumin / creatinine urine ratio; Future    Comprehensive metabolic panel; Future    CBC and differential; Future    Lipid panel; Future    TSH, 3rd generation with Free T4 reflex; Future    HTN, goal below 140/80  Stable, cont current       Venous stasis ulcer of left calf limited to breakdown of skin without varicose veins (HCC)  Stable, cont wound care  New dressing applied in office               History of Present Illness   53 y/o male PMH type 2 DM, HTN, IBS, family history of colon cancer, gallstones presents for follow up.    He has made a lot of dietary changes recently. He states his BS was about 120. Using prandial insulin.     BP is at goal with restarting medication.    Leg wound continues to heal. No foul odor, smell, drainage.     He feels mentally well. Declines medication.         Review of Systems   Constitutional: Negative.    HENT: Negative.     Respiratory: Negative.     Cardiovascular: Negative.    Gastrointestinal: Negative.    Musculoskeletal: Negative.        Objective   /78   Pulse 91   Ht 6' (1.829 m)   Wt 95.7 kg (211 lb)   SpO2 97%   BMI 28.62 kg/m²      Physical Exam  Vitals and nursing note reviewed.   Constitutional:       General: He is not in acute distress.     Appearance: He is well-developed.   HENT:      Head: Normocephalic and atraumatic.     Eyes:      Conjunctiva/sclera: Conjunctivae normal.       Cardiovascular:      Rate and Rhythm: Normal rate and regular rhythm.      Heart sounds: No murmur heard.  Pulmonary:      Effort:  Pulmonary effort is normal. No respiratory distress.      Breath sounds: Normal breath sounds.   Abdominal:      Palpations: Abdomen is soft.      Tenderness: There is no abdominal tenderness.     Musculoskeletal:         General: No swelling.      Cervical back: Neck supple.      Right lower leg: No edema.      Left lower leg: No edema.     Skin:     General: Skin is warm and dry.      Capillary Refill: Capillary refill takes less than 2 seconds.     Neurological:      Mental Status: He is alert and oriented to person, place, and time. Mental status is at baseline.     Psychiatric:         Mood and Affect: Mood normal.       Administrative Statements   I have spent a total time of 30 minutes in caring for this patient on the day of the visit/encounter including Diagnostic results, Prognosis, Risks and benefits of tx options, Instructions for management, Patient and family education, Importance of tx compliance, Risk factor reductions, Impressions, Counseling / Coordination of care, Documenting in the medical record, Reviewing/placing orders in the medical record (including tests, medications, and/or procedures), and Obtaining or reviewing history  .

## 2025-07-25 NOTE — ASSESSMENT & PLAN NOTE
Cont current. Discussed diet in detail   Recheck 2 months     Lab Results   Component Value Date    HGBA1C 10.5 (A) 06/20/2025       Orders:    Albumin / creatinine urine ratio; Future    Comprehensive metabolic panel; Future    CBC and differential; Future    Lipid panel; Future    TSH, 3rd generation with Free T4 reflex; Future